# Patient Record
Sex: MALE | Race: BLACK OR AFRICAN AMERICAN | NOT HISPANIC OR LATINO | URBAN - METROPOLITAN AREA
[De-identification: names, ages, dates, MRNs, and addresses within clinical notes are randomized per-mention and may not be internally consistent; named-entity substitution may affect disease eponyms.]

---

## 2017-09-11 ENCOUNTER — INPATIENT (INPATIENT)
Facility: HOSPITAL | Age: 57
LOS: 1 days | Discharge: HOME | DRG: 872 | End: 2017-09-13
Attending: STUDENT IN AN ORGANIZED HEALTH CARE EDUCATION/TRAINING PROGRAM | Admitting: STUDENT IN AN ORGANIZED HEALTH CARE EDUCATION/TRAINING PROGRAM
Payer: COMMERCIAL

## 2017-09-11 VITALS
OXYGEN SATURATION: 98 % | TEMPERATURE: 103 F | SYSTOLIC BLOOD PRESSURE: 166 MMHG | DIASTOLIC BLOOD PRESSURE: 70 MMHG | RESPIRATION RATE: 18 BRPM | HEART RATE: 100 BPM

## 2017-09-11 DIAGNOSIS — E78.5 HYPERLIPIDEMIA, UNSPECIFIED: ICD-10-CM

## 2017-09-11 DIAGNOSIS — I10 ESSENTIAL (PRIMARY) HYPERTENSION: ICD-10-CM

## 2017-09-11 DIAGNOSIS — R63.8 OTHER SYMPTOMS AND SIGNS CONCERNING FOOD AND FLUID INTAKE: ICD-10-CM

## 2017-09-11 DIAGNOSIS — Z87.09 PERSONAL HISTORY OF OTHER DISEASES OF THE RESPIRATORY SYSTEM: ICD-10-CM

## 2017-09-11 DIAGNOSIS — Z29.9 ENCOUNTER FOR PROPHYLACTIC MEASURES, UNSPECIFIED: ICD-10-CM

## 2017-09-11 DIAGNOSIS — E87.6 HYPOKALEMIA: ICD-10-CM

## 2017-09-11 DIAGNOSIS — A41.9 SEPSIS, UNSPECIFIED ORGANISM: ICD-10-CM

## 2017-09-11 DIAGNOSIS — Z82.49 FAMILY HISTORY OF ISCHEMIC HEART DISEASE AND OTHER DISEASES OF THE CIRCULATORY SYSTEM: Chronic | ICD-10-CM

## 2017-09-11 LAB
ALBUMIN SERPL ELPH-MCNC: 4.3 G/DL — SIGNIFICANT CHANGE UP (ref 3.3–5)
ALP SERPL-CCNC: 47 U/L — SIGNIFICANT CHANGE UP (ref 40–120)
ALT FLD-CCNC: 35 U/L — SIGNIFICANT CHANGE UP (ref 10–45)
ANION GAP SERPL CALC-SCNC: 11 MMOL/L — SIGNIFICANT CHANGE UP (ref 5–17)
ANION GAP SERPL CALC-SCNC: 14 MMOL/L — SIGNIFICANT CHANGE UP (ref 5–17)
APPEARANCE UR: CLEAR — SIGNIFICANT CHANGE UP
AST SERPL-CCNC: 91 U/L — HIGH (ref 10–40)
BASOPHILS NFR BLD AUTO: 0 % — SIGNIFICANT CHANGE UP (ref 0–2)
BILIRUB SERPL-MCNC: 0.7 MG/DL — SIGNIFICANT CHANGE UP (ref 0.2–1.2)
BILIRUB UR-MCNC: NEGATIVE — SIGNIFICANT CHANGE UP
BUN SERPL-MCNC: 11 MG/DL — SIGNIFICANT CHANGE UP (ref 7–23)
BUN SERPL-MCNC: 6 MG/DL — LOW (ref 7–23)
CALCIUM SERPL-MCNC: 8 MG/DL — LOW (ref 8.4–10.5)
CALCIUM SERPL-MCNC: 9.3 MG/DL — SIGNIFICANT CHANGE UP (ref 8.4–10.5)
CHLORIDE SERPL-SCNC: 106 MMOL/L — SIGNIFICANT CHANGE UP (ref 96–108)
CHLORIDE SERPL-SCNC: 98 MMOL/L — SIGNIFICANT CHANGE UP (ref 96–108)
CO2 SERPL-SCNC: 22 MMOL/L — SIGNIFICANT CHANGE UP (ref 22–31)
CO2 SERPL-SCNC: 24 MMOL/L — SIGNIFICANT CHANGE UP (ref 22–31)
COLOR SPEC: YELLOW — SIGNIFICANT CHANGE UP
COMMENT - URINE: SIGNIFICANT CHANGE UP
CREAT SERPL-MCNC: 0.9 MG/DL — SIGNIFICANT CHANGE UP (ref 0.5–1.3)
CREAT SERPL-MCNC: 1.1 MG/DL — SIGNIFICANT CHANGE UP (ref 0.5–1.3)
DIFF PNL FLD: (no result)
EOSINOPHIL NFR BLD AUTO: 1 % — SIGNIFICANT CHANGE UP (ref 0–6)
EPI CELLS # UR: SIGNIFICANT CHANGE UP /HPF
GLUCOSE SERPL-MCNC: 119 MG/DL — HIGH (ref 70–99)
GLUCOSE SERPL-MCNC: 121 MG/DL — HIGH (ref 70–99)
GLUCOSE UR QL: NEGATIVE — SIGNIFICANT CHANGE UP
HCT VFR BLD CALC: 34 % — LOW (ref 39–50)
HCT VFR BLD CALC: 38.5 % — LOW (ref 39–50)
HGB BLD-MCNC: 11.3 G/DL — LOW (ref 13–17)
HGB BLD-MCNC: 13.2 G/DL — SIGNIFICANT CHANGE UP (ref 13–17)
HYPOCHROMIA BLD QL: SLIGHT — SIGNIFICANT CHANGE UP
KETONES UR-MCNC: 15 MG/DL
LACTATE SERPL-SCNC: 1.4 MMOL/L — SIGNIFICANT CHANGE UP (ref 0.5–2)
LEUKOCYTE ESTERASE UR-ACNC: NEGATIVE — SIGNIFICANT CHANGE UP
LIDOCAIN IGE QN: 25 U/L — SIGNIFICANT CHANGE UP (ref 7–60)
LYMPHOCYTES # BLD AUTO: 5 % — LOW (ref 13–44)
MANUAL DIF COMMENT BLD-IMP: SIGNIFICANT CHANGE UP
MCHC RBC-ENTMCNC: 29.8 PG — SIGNIFICANT CHANGE UP (ref 27–34)
MCHC RBC-ENTMCNC: 30.4 PG — SIGNIFICANT CHANGE UP (ref 27–34)
MCHC RBC-ENTMCNC: 33.2 G/DL — SIGNIFICANT CHANGE UP (ref 32–36)
MCHC RBC-ENTMCNC: 34.3 G/DL — SIGNIFICANT CHANGE UP (ref 32–36)
MCV RBC AUTO: 88.7 FL — SIGNIFICANT CHANGE UP (ref 80–100)
MCV RBC AUTO: 89.7 FL — SIGNIFICANT CHANGE UP (ref 80–100)
MONOCYTES NFR BLD AUTO: 4 % — SIGNIFICANT CHANGE UP (ref 2–14)
NEUTROPHILS NFR BLD AUTO: 41 % — LOW (ref 43–77)
NEUTS BAND # BLD: 49 % — HIGH
NITRITE UR-MCNC: NEGATIVE — SIGNIFICANT CHANGE UP
PH UR: 6 — SIGNIFICANT CHANGE UP (ref 5–8)
PLAT MORPH BLD: NORMAL — SIGNIFICANT CHANGE UP
PLATELET # BLD AUTO: 207 K/UL — SIGNIFICANT CHANGE UP (ref 150–400)
PLATELET # BLD AUTO: 245 K/UL — SIGNIFICANT CHANGE UP (ref 150–400)
POTASSIUM SERPL-MCNC: 3.2 MMOL/L — LOW (ref 3.5–5.3)
POTASSIUM SERPL-MCNC: 3.4 MMOL/L — LOW (ref 3.5–5.3)
POTASSIUM SERPL-MCNC: 5.9 MMOL/L — HIGH (ref 3.5–5.3)
POTASSIUM SERPL-SCNC: 3.2 MMOL/L — LOW (ref 3.5–5.3)
POTASSIUM SERPL-SCNC: 3.4 MMOL/L — LOW (ref 3.5–5.3)
POTASSIUM SERPL-SCNC: 5.9 MMOL/L — HIGH (ref 3.5–5.3)
PROT SERPL-MCNC: 7.8 G/DL — SIGNIFICANT CHANGE UP (ref 6–8.3)
PROT UR-MCNC: NEGATIVE MG/DL — SIGNIFICANT CHANGE UP
RBC # BLD: 3.79 M/UL — LOW (ref 4.2–5.8)
RBC # BLD: 4.34 M/UL — SIGNIFICANT CHANGE UP (ref 4.2–5.8)
RBC # FLD: 12.3 % — SIGNIFICANT CHANGE UP (ref 10.3–16.9)
RBC # FLD: 12.3 % — SIGNIFICANT CHANGE UP (ref 10.3–16.9)
RBC BLD AUTO: (no result)
RBC CASTS # UR COMP ASSIST: < 5 /HPF — SIGNIFICANT CHANGE UP
SODIUM SERPL-SCNC: 136 MMOL/L — SIGNIFICANT CHANGE UP (ref 135–145)
SODIUM SERPL-SCNC: 139 MMOL/L — SIGNIFICANT CHANGE UP (ref 135–145)
SP GR SPEC: 1.01 — SIGNIFICANT CHANGE UP (ref 1–1.03)
UROBILINOGEN FLD QL: 0.2 E.U./DL — SIGNIFICANT CHANGE UP
WBC # BLD: 12 K/UL — HIGH (ref 3.8–10.5)
WBC # BLD: 8.9 K/UL — SIGNIFICANT CHANGE UP (ref 3.8–10.5)
WBC # FLD AUTO: 12 K/UL — HIGH (ref 3.8–10.5)
WBC # FLD AUTO: 8.9 K/UL — SIGNIFICANT CHANGE UP (ref 3.8–10.5)
WBC UR QL: < 5 /HPF — SIGNIFICANT CHANGE UP

## 2017-09-11 PROCEDURE — 99285 EMERGENCY DEPT VISIT HI MDM: CPT | Mod: 25

## 2017-09-11 PROCEDURE — 99223 1ST HOSP IP/OBS HIGH 75: CPT

## 2017-09-11 PROCEDURE — 93010 ELECTROCARDIOGRAM REPORT: CPT | Mod: 77

## 2017-09-11 PROCEDURE — 74177 CT ABD & PELVIS W/CONTRAST: CPT | Mod: 26

## 2017-09-11 PROCEDURE — 93010 ELECTROCARDIOGRAM REPORT: CPT

## 2017-09-11 RX ORDER — CIPROFLOXACIN LACTATE 400MG/40ML
400 VIAL (ML) INTRAVENOUS EVERY 12 HOURS
Qty: 0 | Refills: 0 | Status: DISCONTINUED | OUTPATIENT
Start: 2017-09-11 | End: 2017-09-13

## 2017-09-11 RX ORDER — ATORVASTATIN CALCIUM 80 MG/1
20 TABLET, FILM COATED ORAL AT BEDTIME
Qty: 0 | Refills: 0 | Status: DISCONTINUED | OUTPATIENT
Start: 2017-09-11 | End: 2017-09-13

## 2017-09-11 RX ORDER — MORPHINE SULFATE 50 MG/1
0.5 CAPSULE, EXTENDED RELEASE ORAL EVERY 6 HOURS
Qty: 0 | Refills: 0 | Status: DISCONTINUED | OUTPATIENT
Start: 2017-09-11 | End: 2017-09-13

## 2017-09-11 RX ORDER — ROSUVASTATIN CALCIUM 5 MG/1
1 TABLET ORAL
Qty: 0 | Refills: 0 | COMMUNITY

## 2017-09-11 RX ORDER — METRONIDAZOLE 500 MG
500 TABLET ORAL EVERY 8 HOURS
Qty: 0 | Refills: 0 | Status: DISCONTINUED | OUTPATIENT
Start: 2017-09-11 | End: 2017-09-13

## 2017-09-11 RX ORDER — SODIUM CHLORIDE 9 MG/ML
1000 INJECTION INTRAMUSCULAR; INTRAVENOUS; SUBCUTANEOUS
Qty: 0 | Refills: 0 | Status: DISCONTINUED | OUTPATIENT
Start: 2017-09-11 | End: 2017-09-13

## 2017-09-11 RX ORDER — BUDESONIDE AND FORMOTEROL FUMARATE DIHYDRATE 160; 4.5 UG/1; UG/1
2 AEROSOL RESPIRATORY (INHALATION)
Qty: 0 | Refills: 0 | COMMUNITY

## 2017-09-11 RX ORDER — POTASSIUM CHLORIDE 20 MEQ
20 PACKET (EA) ORAL
Qty: 0 | Refills: 0 | Status: DISCONTINUED | OUTPATIENT
Start: 2017-09-11 | End: 2017-09-11

## 2017-09-11 RX ORDER — POTASSIUM CHLORIDE 20 MEQ
40 PACKET (EA) ORAL ONCE
Qty: 0 | Refills: 0 | Status: COMPLETED | OUTPATIENT
Start: 2017-09-11 | End: 2017-09-11

## 2017-09-11 RX ORDER — POTASSIUM CHLORIDE 20 MEQ
10 PACKET (EA) ORAL
Qty: 0 | Refills: 0 | Status: COMPLETED | OUTPATIENT
Start: 2017-09-11 | End: 2017-09-11

## 2017-09-11 RX ORDER — POTASSIUM CHLORIDE 20 MEQ
10 PACKET (EA) ORAL ONCE
Qty: 0 | Refills: 0 | Status: DISCONTINUED | OUTPATIENT
Start: 2017-09-11 | End: 2017-09-13

## 2017-09-11 RX ORDER — METRONIDAZOLE 500 MG
500 TABLET ORAL ONCE
Qty: 0 | Refills: 0 | Status: COMPLETED | OUTPATIENT
Start: 2017-09-11 | End: 2017-09-11

## 2017-09-11 RX ORDER — SODIUM CHLORIDE 9 MG/ML
1000 INJECTION INTRAMUSCULAR; INTRAVENOUS; SUBCUTANEOUS ONCE
Qty: 0 | Refills: 0 | Status: COMPLETED | OUTPATIENT
Start: 2017-09-11 | End: 2017-09-11

## 2017-09-11 RX ORDER — IOHEXOL 300 MG/ML
50 INJECTION, SOLUTION INTRAVENOUS ONCE
Qty: 0 | Refills: 0 | Status: COMPLETED | OUTPATIENT
Start: 2017-09-11 | End: 2017-09-11

## 2017-09-11 RX ORDER — ACETAMINOPHEN 500 MG
650 TABLET ORAL ONCE
Qty: 0 | Refills: 0 | Status: COMPLETED | OUTPATIENT
Start: 2017-09-11 | End: 2017-09-11

## 2017-09-11 RX ORDER — ACETAMINOPHEN 500 MG
1000 TABLET ORAL ONCE
Qty: 0 | Refills: 0 | Status: COMPLETED | OUTPATIENT
Start: 2017-09-11 | End: 2017-09-11

## 2017-09-11 RX ORDER — BUDESONIDE AND FORMOTEROL FUMARATE DIHYDRATE 160; 4.5 UG/1; UG/1
2 AEROSOL RESPIRATORY (INHALATION)
Qty: 0 | Refills: 0 | Status: DISCONTINUED | OUTPATIENT
Start: 2017-09-11 | End: 2017-09-13

## 2017-09-11 RX ORDER — SODIUM CHLORIDE 9 MG/ML
1000 INJECTION INTRAMUSCULAR; INTRAVENOUS; SUBCUTANEOUS
Qty: 0 | Refills: 0 | Status: DISCONTINUED | OUTPATIENT
Start: 2017-09-11 | End: 2017-09-11

## 2017-09-11 RX ORDER — MORPHINE SULFATE 50 MG/1
0.5 CAPSULE, EXTENDED RELEASE ORAL ONCE
Qty: 0 | Refills: 0 | Status: DISCONTINUED | OUTPATIENT
Start: 2017-09-11 | End: 2017-09-11

## 2017-09-11 RX ORDER — CIPROFLOXACIN LACTATE 400MG/40ML
400 VIAL (ML) INTRAVENOUS ONCE
Qty: 0 | Refills: 0 | Status: COMPLETED | OUTPATIENT
Start: 2017-09-11 | End: 2017-09-11

## 2017-09-11 RX ORDER — ONDANSETRON 8 MG/1
4 TABLET, FILM COATED ORAL EVERY 6 HOURS
Qty: 0 | Refills: 0 | Status: DISCONTINUED | OUTPATIENT
Start: 2017-09-11 | End: 2017-09-13

## 2017-09-11 RX ORDER — KETOROLAC TROMETHAMINE 30 MG/ML
30 SYRINGE (ML) INJECTION ONCE
Qty: 0 | Refills: 0 | Status: DISCONTINUED | OUTPATIENT
Start: 2017-09-11 | End: 2017-09-11

## 2017-09-11 RX ORDER — ACETAMINOPHEN 500 MG
650 TABLET ORAL EVERY 6 HOURS
Qty: 0 | Refills: 0 | Status: DISCONTINUED | OUTPATIENT
Start: 2017-09-11 | End: 2017-09-13

## 2017-09-11 RX ADMIN — IOHEXOL 50 MILLILITER(S): 300 INJECTION, SOLUTION INTRAVENOUS at 05:08

## 2017-09-11 RX ADMIN — MORPHINE SULFATE 0.5 MILLIGRAM(S): 50 CAPSULE, EXTENDED RELEASE ORAL at 11:00

## 2017-09-11 RX ADMIN — Medication 30 MILLIGRAM(S): at 04:35

## 2017-09-11 RX ADMIN — SODIUM CHLORIDE 150 MILLILITER(S): 9 INJECTION INTRAMUSCULAR; INTRAVENOUS; SUBCUTANEOUS at 11:07

## 2017-09-11 RX ADMIN — Medication 650 MILLIGRAM(S): at 10:07

## 2017-09-11 RX ADMIN — MORPHINE SULFATE 0.5 MILLIGRAM(S): 50 CAPSULE, EXTENDED RELEASE ORAL at 17:24

## 2017-09-11 RX ADMIN — SODIUM CHLORIDE 2000 MILLILITER(S): 9 INJECTION INTRAMUSCULAR; INTRAVENOUS; SUBCUTANEOUS at 05:08

## 2017-09-11 RX ADMIN — Medication 650 MILLIGRAM(S): at 17:24

## 2017-09-11 RX ADMIN — Medication 200 MILLIGRAM(S): at 17:24

## 2017-09-11 RX ADMIN — Medication 100 MILLIGRAM(S): at 15:14

## 2017-09-11 RX ADMIN — Medication 30 MILLIGRAM(S): at 05:08

## 2017-09-11 RX ADMIN — MORPHINE SULFATE 0.5 MILLIGRAM(S): 50 CAPSULE, EXTENDED RELEASE ORAL at 10:45

## 2017-09-11 RX ADMIN — ATORVASTATIN CALCIUM 20 MILLIGRAM(S): 80 TABLET, FILM COATED ORAL at 22:59

## 2017-09-11 RX ADMIN — Medication 200 MILLIGRAM(S): at 05:08

## 2017-09-11 RX ADMIN — MORPHINE SULFATE 0.5 MILLIGRAM(S): 50 CAPSULE, EXTENDED RELEASE ORAL at 17:39

## 2017-09-11 RX ADMIN — Medication 100 MILLIEQUIVALENT(S): at 12:11

## 2017-09-11 RX ADMIN — Medication 100 MILLIEQUIVALENT(S): at 10:45

## 2017-09-11 RX ADMIN — Medication 400 MILLIGRAM(S): at 11:20

## 2017-09-11 RX ADMIN — SODIUM CHLORIDE 2000 MILLILITER(S): 9 INJECTION INTRAMUSCULAR; INTRAVENOUS; SUBCUTANEOUS at 11:07

## 2017-09-11 RX ADMIN — Medication 650 MILLIGRAM(S): at 03:36

## 2017-09-11 RX ADMIN — SODIUM CHLORIDE 100 MILLILITER(S): 9 INJECTION INTRAMUSCULAR; INTRAVENOUS; SUBCUTANEOUS at 10:45

## 2017-09-11 RX ADMIN — Medication 100 MILLIGRAM(S): at 06:07

## 2017-09-11 RX ADMIN — BUDESONIDE AND FORMOTEROL FUMARATE DIHYDRATE 2 PUFF(S): 160; 4.5 AEROSOL RESPIRATORY (INHALATION) at 22:59

## 2017-09-11 RX ADMIN — Medication 100 MILLIGRAM(S): at 23:38

## 2017-09-11 RX ADMIN — Medication 100 MILLIEQUIVALENT(S): at 14:04

## 2017-09-11 RX ADMIN — Medication 40 MILLIEQUIVALENT(S): at 10:07

## 2017-09-11 RX ADMIN — SODIUM CHLORIDE 2000 MILLILITER(S): 9 INJECTION INTRAMUSCULAR; INTRAVENOUS; SUBCUTANEOUS at 03:36

## 2017-09-11 NOTE — H&P ADULT - PROBLEM SELECTOR PLAN 3
Pt hypertensive on admission w/ BP of 166/70.   -hold home dose of HCTZ 25mg in the setting of sepsis. Pt hypertensive on admission w/ BP of 166/70.   -hold home dose of HCTZ 25mg in the setting of sepsis and diarrhea.

## 2017-09-11 NOTE — ED ADULT NURSE NOTE - OBJECTIVE STATEMENT
pt walked into ED c/o fever, chills, diarrhea onset 4pm yesterday. Pt states he has sharp pain when he urinates. pt denies blood in urine. pt denies N/V, CP, SOB, pt walked into ED c/o fever, chills, diarrhea onset 4pm yesterday. Pt states he has sharp pain when he urinates. pt denies blood in urine. pt denies N/V, CP, SOB, dizziness.

## 2017-09-11 NOTE — H&P ADULT - ASSESSMENT
58 y/o M w/ PMHx of Asthma, HTN, HLD presenting w/ diffuse abdominal pain, nausea, watery, non-bloody diarrhea 2/2 to infectious/inflammatory colitis.

## 2017-09-11 NOTE — H&P ADULT - PROBLEM SELECTOR PLAN 1
Pt w/ T >100, HR >100, WBC of 12 w/ 49% bandemia presenting w/ abdominal pain and non-bloody, watery diarrhea secondary to Pt w/ T >100, HR >100, WBC of 12 w/ 49% bandemia on admission presenting w/ abdominal pain and non-bloody, watery diarrhea secondary to infectious vs. inflammatory colitis on CT scan.. Lactate 1.4 on admission.   -s/p 1 L NS   -c/w Cipro 400mg q12hr+ Flagyl 500mg q8hrs  -Pain control  -Tylenol 650mg PRN for fever  -Zofran 4mg q6hrs for nausea Secondary to infectious vs. inflmattory colitis. Pt w/ T >100, HR >100, WBC of 12 w/ 49% bandemia on admission presenting w/ abdominal pain and non-bloody, watery diarrhea. Lactate 1.4 on admission.   -s/p 1 L NS   -c/w Cipro 400mg q12hr+ Flagyl 500mg q8hrs  -Pain control w/ IV morphine 0.5mg x1  -Tylenol 650mg PRN for fever  -Zofran 4mg q6hrs for nausea  -NS at 100cc/hr Secondary to infectious vs. inflmattory colitis. Pt w/ T >100, HR >100, WBC of 12 w/ 49% bandemia on admission presenting w/ abdominal pain and non-bloody, watery diarrhea. Lactate 1.4 on admission.   -s/p 1 L NS in the ED.   -1 L NS and NS @150cc/hr.   -c/w Cipro 400mg q12hr+ Flagyl 500mg q8hrs  -Pain control w/ IV morphine 0.5mg q6hrs  -Tylenol 650mg x1  -IV Tylenol 1g x1  -Zofran 4mg q6hrs for nausea

## 2017-09-11 NOTE — H&P ADULT - NSHPSOCIALHISTORY_GEN_ALL_CORE
Denies tobacco.   Denies alcohol use.   Denies IV and recreational drugs.   Pt lives at home w/ family.

## 2017-09-11 NOTE — ED PROVIDER NOTE - OBJECTIVE STATEMENT
here with diarrhea today, body aches, fever/chills.  Notes about 6-8 watery episodes, non bloody.  No vomiting.  Denies sick contacts, travel, recent antibiotic use.

## 2017-09-11 NOTE — H&P ADULT - ATTENDING COMMENTS
Pt seen and examined, VS reviewed, exam as above, labs reviewed.  56 yo M with sepsis 2/2 colitis, hx of asthma, htn, hpl  - pt quite febrile and rigoring 2 x during day, concern for bacterial translocation- f/u bcx from overnight, has received 2 L IVF and euvolemic and hemodyn stable, started on cipro/flagyl  - will f/u closely  - dispo: for home once medically stable

## 2017-09-11 NOTE — H&P ADULT - PROBLEM SELECTOR PLAN 4
Never been intubated. Pt currently not in an acute asthma exacerbation. Satting well on room air, does not appear to be in respiratory distress.   -c/w symbicort 2 puffs BID

## 2017-09-11 NOTE — H&P ADULT - HISTORY OF PRESENT ILLNESS
56 y/o M w/ PMHx of Asthma, HTN, HLD presenting w/ diarrhea. 58 y/o M w/ PMHx of Asthma, HTN, HLD presenting w/ abdominal pain and diarrhea since Sunday afternoon. Pt reports that his symptoms first began Sunday around 4pm after having a meal consisting of rice, vegetables and fish. About 1 hour later pt reports he began having nausea, abdominal pain, fever, chills and watery, non-bloody diarrhea. Pt reports having about 8-10 bowel movements yesterday. Does not report any recent travel or recent abx use. Reports having similar symptoms about 3 years ago.     Currently on ROS pt complaining of diffuse abdominal pain, body aches, nausea, chills, diarrhea. Denies chest pain, shortness of breath, headache, dizziness, constipation.     ED Course:   T: 102.9, , /70, RR 18, O2 98% on room air  NS x1L, Cipro 400mg x1, Flagyl 500mg x1, Toradol 30mg IV x1.   CT abd/pelvis: Moderate infectious/inflammatory colitis of ascending and proximal transverse colon.

## 2017-09-11 NOTE — ED PROVIDER NOTE - MEDICAL DECISION MAKING DETAILS
acute febrile diarrheal illness.  labs with leukocytosis, significant bandemia.  suspect shigella infection.  given cipro/flagyl pending cultures.  ct to r/o other pathology.  admit for further treatment. acute febrile diarrheal illness.  labs with leukocytosis, significant bandemia.  suspect shigella infection.  given cipro/flagyl pending cultures.  ct to r/o other pathology.  signed out to Dr. Hill/ RITA Matt pending ct with plan to admit for further treatment.

## 2017-09-11 NOTE — H&P ADULT - PROBLEM SELECTOR PLAN 2
Pt initially w/ potassium of 5.9 on admission but due to hemolyzed RBC. Repeat potassium of 3.2. EKG normal.   -replete KCL 10meq x3  -KCL 40meq tablet x1  -continue to monitor, replete as needed. Pt initially w/ potassium of 5.9 on admission but due to hemolyzed RBC. Repeat potassium of 3.2 secondary to watery diarrhea. EKG normal.   -replete KCL 10meq x3  -KCL 40meq tablet x1  -continue to monitor, replete as needed.

## 2017-09-11 NOTE — ED ADULT NURSE REASSESSMENT NOTE - NS ED NURSE REASSESS COMMENT FT1
Patient had a temperature here in the ED, given tylenol as ordered by team. Nurse kina made team aware of fever of 103F.

## 2017-09-11 NOTE — H&P ADULT - PROBLEM SELECTOR PLAN 5
Pt w/ hx of hyperlipidemia  -On Crestor 5mg daily at home. Therapeutic interchange w/ atorvastatin 20mg daily.

## 2017-09-11 NOTE — H&P ADULT - NSHPLABSRESULTS_GEN_ALL_CORE
.  LABS:                         13.2   12.0  )-----------( 245      ( 11 Sep 2017 03:10 )             38.5         x   |  x   |  x   ----------------------------<  x   3.2<L>   |  x   |  x     Ca    9.3      11 Sep 2017 03:10    TPro  7.8  /  Alb  4.3  /  TBili  0.7  /  DBili  x   /  AST  91<H>  /  ALT  35  /  AlkPhos  47        Urinalysis Basic - ( 11 Sep 2017 03:37 )    Color: Yellow / Appearance: Clear / S.010 / pH: x  Gluc: x / Ketone: 15 mg/dL  / Bili: NEGATIVE / Urobili: 0.2 E.U./dL   Blood: x / Protein: NEGATIVE mg/dL / Nitrite: NEGATIVE   Leuk Esterase: NEGATIVE / RBC: < 5 /HPF / WBC < 5 /HPF   Sq Epi: x / Non Sq Epi: Few /HPF / Bacteria: x    Lactate, Blood: 1.4 mmoL/L ( @ 03:07)      RADIOLOGY, EKG & ADDITIONAL TESTS: < from: CT Abdomen and Pelvis w/ Oral Cont and w/ IV Cont (17 @ 06:40) >    IMPRESSION:    Moderate infectious/inflammatory colitis of the ascending and proximal   transverse colon.

## 2017-09-11 NOTE — H&P ADULT - NSHPPHYSICALEXAM_GEN_ALL_CORE
.  VITAL SIGNS:  T(C): 37.4 (09-11-17 @ 08:24), Max: 39.4 (09-11-17 @ 02:30)  T(F): 99.4 (09-11-17 @ 08:24), Max: 102.9 (09-11-17 @ 02:30)  HR: 92 (09-11-17 @ 08:24) (82 - 100)  BP: 125/52 (09-11-17 @ 08:24) (119/63 - 166/70)  BP(mean): --  RR: 18 (09-11-17 @ 08:24) (18 - 19)  SpO2: 97% (09-11-17 @ 08:24) (97% - 99%)  Wt(kg): --    PHYSICAL EXAM:    Constitutional: WDWN gentleman in apparent distress due to pain laying in bed.   Head: NC/AT  Eyes: PERRL, EOMI, anicteric sclera  ENT: no nasal discharge; uvula midline, no oropharyngeal erythema or exudates; MMM  Neck: supple; no JVD or thyromegaly  Respiratory: CTA B/L; no W/R/R, no retractions  Cardiac: +S1/S2; RRR; no M/R/G; PMI non-displaced  Gastrointestinal: soft, ND, no guarding, diffuse tenderness to palpation. + BS.   Extremities: WWP, no clubbing or cyanosis; no peripheral edema  Musculoskeletal: NROM x4; no joint swelling, tenderness or erythema  Vascular: 2+ DP/PT pulses B/L  Neurologic: AAOx3; CNII-XII grossly intact; no focal deficits

## 2017-09-12 DIAGNOSIS — J45.909 UNSPECIFIED ASTHMA, UNCOMPLICATED: ICD-10-CM

## 2017-09-12 DIAGNOSIS — E78.5 HYPERLIPIDEMIA, UNSPECIFIED: ICD-10-CM

## 2017-09-12 DIAGNOSIS — A41.9 SEPSIS, UNSPECIFIED ORGANISM: ICD-10-CM

## 2017-09-12 LAB
ANION GAP SERPL CALC-SCNC: 13 MMOL/L — SIGNIFICANT CHANGE UP (ref 5–17)
ANION GAP SERPL CALC-SCNC: 13 MMOL/L — SIGNIFICANT CHANGE UP (ref 5–17)
ANION GAP SERPL CALC-SCNC: 15 MMOL/L — SIGNIFICANT CHANGE UP (ref 5–17)
BASOPHILS NFR BLD AUTO: 0.1 % — SIGNIFICANT CHANGE UP (ref 0–2)
BUN SERPL-MCNC: 7 MG/DL — SIGNIFICANT CHANGE UP (ref 7–23)
BUN SERPL-MCNC: 7 MG/DL — SIGNIFICANT CHANGE UP (ref 7–23)
BUN SERPL-MCNC: 8 MG/DL — SIGNIFICANT CHANGE UP (ref 7–23)
CALCIUM SERPL-MCNC: 8.2 MG/DL — LOW (ref 8.4–10.5)
CALCIUM SERPL-MCNC: 8.3 MG/DL — LOW (ref 8.4–10.5)
CALCIUM SERPL-MCNC: 8.4 MG/DL — SIGNIFICANT CHANGE UP (ref 8.4–10.5)
CHLORIDE SERPL-SCNC: 100 MMOL/L — SIGNIFICANT CHANGE UP (ref 96–108)
CHLORIDE SERPL-SCNC: 102 MMOL/L — SIGNIFICANT CHANGE UP (ref 96–108)
CHLORIDE SERPL-SCNC: 103 MMOL/L — SIGNIFICANT CHANGE UP (ref 96–108)
CO2 SERPL-SCNC: 21 MMOL/L — LOW (ref 22–31)
CO2 SERPL-SCNC: 21 MMOL/L — LOW (ref 22–31)
CO2 SERPL-SCNC: 22 MMOL/L — SIGNIFICANT CHANGE UP (ref 22–31)
CREAT SERPL-MCNC: 0.9 MG/DL — SIGNIFICANT CHANGE UP (ref 0.5–1.3)
CREAT SERPL-MCNC: 1 MG/DL — SIGNIFICANT CHANGE UP (ref 0.5–1.3)
CREAT SERPL-MCNC: 1 MG/DL — SIGNIFICANT CHANGE UP (ref 0.5–1.3)
CULTURE RESULTS: NO GROWTH — SIGNIFICANT CHANGE UP
EOSINOPHIL NFR BLD AUTO: 0.1 % — SIGNIFICANT CHANGE UP (ref 0–6)
GLUCOSE SERPL-MCNC: 104 MG/DL — HIGH (ref 70–99)
GLUCOSE SERPL-MCNC: 121 MG/DL — HIGH (ref 70–99)
GLUCOSE SERPL-MCNC: 90 MG/DL — SIGNIFICANT CHANGE UP (ref 70–99)
HCT VFR BLD CALC: 33.6 % — LOW (ref 39–50)
HGB BLD-MCNC: 11.1 G/DL — LOW (ref 13–17)
LYMPHOCYTES # BLD AUTO: 11 % — LOW (ref 13–44)
MAGNESIUM SERPL-MCNC: 1.9 MG/DL — SIGNIFICANT CHANGE UP (ref 1.6–2.6)
MCHC RBC-ENTMCNC: 30 PG — SIGNIFICANT CHANGE UP (ref 27–34)
MCHC RBC-ENTMCNC: 33 G/DL — SIGNIFICANT CHANGE UP (ref 32–36)
MCV RBC AUTO: 90.8 FL — SIGNIFICANT CHANGE UP (ref 80–100)
MONOCYTES NFR BLD AUTO: 4.3 % — SIGNIFICANT CHANGE UP (ref 2–14)
NEUTROPHILS NFR BLD AUTO: 84.5 % — HIGH (ref 43–77)
PHOSPHATE SERPL-MCNC: 2.4 MG/DL — LOW (ref 2.5–4.5)
PLATELET # BLD AUTO: 201 K/UL — SIGNIFICANT CHANGE UP (ref 150–400)
POTASSIUM SERPL-MCNC: 3 MMOL/L — LOW (ref 3.5–5.3)
POTASSIUM SERPL-MCNC: 3.5 MMOL/L — SIGNIFICANT CHANGE UP (ref 3.5–5.3)
POTASSIUM SERPL-MCNC: 3.5 MMOL/L — SIGNIFICANT CHANGE UP (ref 3.5–5.3)
POTASSIUM SERPL-SCNC: 3 MMOL/L — LOW (ref 3.5–5.3)
POTASSIUM SERPL-SCNC: 3.5 MMOL/L — SIGNIFICANT CHANGE UP (ref 3.5–5.3)
POTASSIUM SERPL-SCNC: 3.5 MMOL/L — SIGNIFICANT CHANGE UP (ref 3.5–5.3)
RBC # BLD: 3.7 M/UL — LOW (ref 4.2–5.8)
RBC # FLD: 12.3 % — SIGNIFICANT CHANGE UP (ref 10.3–16.9)
SODIUM SERPL-SCNC: 135 MMOL/L — SIGNIFICANT CHANGE UP (ref 135–145)
SODIUM SERPL-SCNC: 137 MMOL/L — SIGNIFICANT CHANGE UP (ref 135–145)
SODIUM SERPL-SCNC: 138 MMOL/L — SIGNIFICANT CHANGE UP (ref 135–145)
SPECIMEN SOURCE: SIGNIFICANT CHANGE UP
WBC # BLD: 8.8 K/UL — SIGNIFICANT CHANGE UP (ref 3.8–10.5)
WBC # FLD AUTO: 8.8 K/UL — SIGNIFICANT CHANGE UP (ref 3.8–10.5)

## 2017-09-12 PROCEDURE — 99233 SBSQ HOSP IP/OBS HIGH 50: CPT

## 2017-09-12 RX ORDER — POTASSIUM CHLORIDE 20 MEQ
40 PACKET (EA) ORAL ONCE
Qty: 0 | Refills: 0 | Status: COMPLETED | OUTPATIENT
Start: 2017-09-12 | End: 2017-09-12

## 2017-09-12 RX ORDER — ACETAMINOPHEN 500 MG
650 TABLET ORAL ONCE
Qty: 0 | Refills: 0 | Status: COMPLETED | OUTPATIENT
Start: 2017-09-12 | End: 2017-09-12

## 2017-09-12 RX ORDER — POTASSIUM CHLORIDE 20 MEQ
10 PACKET (EA) ORAL
Qty: 0 | Refills: 0 | Status: COMPLETED | OUTPATIENT
Start: 2017-09-12 | End: 2017-09-12

## 2017-09-12 RX ADMIN — MORPHINE SULFATE 0.5 MILLIGRAM(S): 50 CAPSULE, EXTENDED RELEASE ORAL at 22:17

## 2017-09-12 RX ADMIN — Medication 40 MILLIEQUIVALENT(S): at 04:12

## 2017-09-12 RX ADMIN — Medication 200 MILLIGRAM(S): at 05:31

## 2017-09-12 RX ADMIN — SODIUM CHLORIDE 150 MILLILITER(S): 9 INJECTION INTRAMUSCULAR; INTRAVENOUS; SUBCUTANEOUS at 05:31

## 2017-09-12 RX ADMIN — MORPHINE SULFATE 0.5 MILLIGRAM(S): 50 CAPSULE, EXTENDED RELEASE ORAL at 21:22

## 2017-09-12 RX ADMIN — Medication 100 MILLIEQUIVALENT(S): at 04:13

## 2017-09-12 RX ADMIN — Medication 100 MILLIGRAM(S): at 08:31

## 2017-09-12 RX ADMIN — Medication 40 MILLIEQUIVALENT(S): at 10:10

## 2017-09-12 RX ADMIN — Medication 650 MILLIGRAM(S): at 01:06

## 2017-09-12 RX ADMIN — Medication 200 MILLIGRAM(S): at 17:25

## 2017-09-12 RX ADMIN — BUDESONIDE AND FORMOTEROL FUMARATE DIHYDRATE 2 PUFF(S): 160; 4.5 AEROSOL RESPIRATORY (INHALATION) at 22:55

## 2017-09-12 RX ADMIN — Medication 650 MILLIGRAM(S): at 15:06

## 2017-09-12 RX ADMIN — Medication 40 MILLIEQUIVALENT(S): at 20:32

## 2017-09-12 RX ADMIN — Medication 100 MILLIEQUIVALENT(S): at 06:47

## 2017-09-12 RX ADMIN — BUDESONIDE AND FORMOTEROL FUMARATE DIHYDRATE 2 PUFF(S): 160; 4.5 AEROSOL RESPIRATORY (INHALATION) at 10:10

## 2017-09-12 RX ADMIN — Medication 650 MILLIGRAM(S): at 14:36

## 2017-09-12 RX ADMIN — ATORVASTATIN CALCIUM 20 MILLIGRAM(S): 80 TABLET, FILM COATED ORAL at 21:22

## 2017-09-12 RX ADMIN — Medication 100 MILLIGRAM(S): at 14:38

## 2017-09-13 ENCOUNTER — TRANSCRIPTION ENCOUNTER (OUTPATIENT)
Age: 57
End: 2017-09-13

## 2017-09-13 VITALS
TEMPERATURE: 99 F | SYSTOLIC BLOOD PRESSURE: 133 MMHG | OXYGEN SATURATION: 98 % | HEART RATE: 66 BPM | RESPIRATION RATE: 16 BRPM | DIASTOLIC BLOOD PRESSURE: 84 MMHG

## 2017-09-13 LAB
ANION GAP SERPL CALC-SCNC: 14 MMOL/L — SIGNIFICANT CHANGE UP (ref 5–17)
BUN SERPL-MCNC: 7 MG/DL — SIGNIFICANT CHANGE UP (ref 7–23)
CALCIUM SERPL-MCNC: 8.9 MG/DL — SIGNIFICANT CHANGE UP (ref 8.4–10.5)
CHLORIDE SERPL-SCNC: 102 MMOL/L — SIGNIFICANT CHANGE UP (ref 96–108)
CO2 SERPL-SCNC: 22 MMOL/L — SIGNIFICANT CHANGE UP (ref 22–31)
CREAT SERPL-MCNC: 0.9 MG/DL — SIGNIFICANT CHANGE UP (ref 0.5–1.3)
CULTURE RESULTS: SIGNIFICANT CHANGE UP
GLUCOSE SERPL-MCNC: 147 MG/DL — HIGH (ref 70–99)
HCT VFR BLD CALC: 34.8 % — LOW (ref 39–50)
HGB BLD-MCNC: 11.6 G/DL — LOW (ref 13–17)
MAGNESIUM SERPL-MCNC: 2 MG/DL — SIGNIFICANT CHANGE UP (ref 1.6–2.6)
MCHC RBC-ENTMCNC: 29.8 PG — SIGNIFICANT CHANGE UP (ref 27–34)
MCHC RBC-ENTMCNC: 33.3 G/DL — SIGNIFICANT CHANGE UP (ref 32–36)
MCV RBC AUTO: 89.5 FL — SIGNIFICANT CHANGE UP (ref 80–100)
PHOSPHATE SERPL-MCNC: 1.7 MG/DL — LOW (ref 2.5–4.5)
PLATELET # BLD AUTO: 220 K/UL — SIGNIFICANT CHANGE UP (ref 150–400)
POTASSIUM SERPL-MCNC: 3.6 MMOL/L — SIGNIFICANT CHANGE UP (ref 3.5–5.3)
POTASSIUM SERPL-SCNC: 3.6 MMOL/L — SIGNIFICANT CHANGE UP (ref 3.5–5.3)
RBC # BLD: 3.89 M/UL — LOW (ref 4.2–5.8)
RBC # FLD: 12.7 % — SIGNIFICANT CHANGE UP (ref 10.3–16.9)
SODIUM SERPL-SCNC: 138 MMOL/L — SIGNIFICANT CHANGE UP (ref 135–145)
SPECIMEN SOURCE: SIGNIFICANT CHANGE UP
WBC # BLD: 6 K/UL — SIGNIFICANT CHANGE UP (ref 3.8–10.5)
WBC # FLD AUTO: 6 K/UL — SIGNIFICANT CHANGE UP (ref 3.8–10.5)

## 2017-09-13 PROCEDURE — 93005 ELECTROCARDIOGRAM TRACING: CPT | Mod: 77

## 2017-09-13 PROCEDURE — 90686 IIV4 VACC NO PRSV 0.5 ML IM: CPT

## 2017-09-13 PROCEDURE — 99238 HOSP IP/OBS DSCHRG MGMT 30/<: CPT

## 2017-09-13 PROCEDURE — 80048 BASIC METABOLIC PNL TOTAL CA: CPT

## 2017-09-13 PROCEDURE — 87045 FECES CULTURE AEROBIC BACT: CPT

## 2017-09-13 PROCEDURE — 87046 STOOL CULTR AEROBIC BACT EA: CPT

## 2017-09-13 PROCEDURE — 80053 COMPREHEN METABOLIC PANEL: CPT

## 2017-09-13 PROCEDURE — 81001 URINALYSIS AUTO W/SCOPE: CPT

## 2017-09-13 PROCEDURE — 74177 CT ABD & PELVIS W/CONTRAST: CPT

## 2017-09-13 PROCEDURE — 87040 BLOOD CULTURE FOR BACTERIA: CPT

## 2017-09-13 PROCEDURE — 83690 ASSAY OF LIPASE: CPT

## 2017-09-13 PROCEDURE — 36415 COLL VENOUS BLD VENIPUNCTURE: CPT

## 2017-09-13 PROCEDURE — 96375 TX/PRO/DX INJ NEW DRUG ADDON: CPT

## 2017-09-13 PROCEDURE — 87177 OVA AND PARASITES SMEARS: CPT

## 2017-09-13 PROCEDURE — 84100 ASSAY OF PHOSPHORUS: CPT

## 2017-09-13 PROCEDURE — 84132 ASSAY OF SERUM POTASSIUM: CPT

## 2017-09-13 PROCEDURE — 83735 ASSAY OF MAGNESIUM: CPT

## 2017-09-13 PROCEDURE — 96374 THER/PROPH/DIAG INJ IV PUSH: CPT | Mod: XU

## 2017-09-13 PROCEDURE — 94640 AIRWAY INHALATION TREATMENT: CPT

## 2017-09-13 PROCEDURE — 99285 EMERGENCY DEPT VISIT HI MDM: CPT | Mod: 25

## 2017-09-13 PROCEDURE — 85025 COMPLETE CBC W/AUTO DIFF WBC: CPT

## 2017-09-13 PROCEDURE — 83605 ASSAY OF LACTIC ACID: CPT

## 2017-09-13 PROCEDURE — 87086 URINE CULTURE/COLONY COUNT: CPT

## 2017-09-13 PROCEDURE — 85027 COMPLETE CBC AUTOMATED: CPT

## 2017-09-13 RX ORDER — METRONIDAZOLE 500 MG
1 TABLET ORAL
Qty: 7 | Refills: 0 | OUTPATIENT
Start: 2017-09-13 | End: 2017-09-15

## 2017-09-13 RX ORDER — POTASSIUM PHOSPHATE, MONOBASIC POTASSIUM PHOSPHATE, DIBASIC 236; 224 MG/ML; MG/ML
30 INJECTION, SOLUTION INTRAVENOUS ONCE
Qty: 0 | Refills: 0 | Status: COMPLETED | OUTPATIENT
Start: 2017-09-13 | End: 2017-09-13

## 2017-09-13 RX ORDER — MOXIFLOXACIN HYDROCHLORIDE TABLETS, 400 MG 400 MG/1
1 TABLET, FILM COATED ORAL
Qty: 5 | Refills: 0 | OUTPATIENT
Start: 2017-09-13 | End: 2017-09-15

## 2017-09-13 RX ORDER — CIPROFLOXACIN LACTATE 400MG/40ML
500 VIAL (ML) INTRAVENOUS ONCE
Qty: 0 | Refills: 0 | Status: COMPLETED | OUTPATIENT
Start: 2017-09-13 | End: 2017-09-13

## 2017-09-13 RX ORDER — METRONIDAZOLE 500 MG
500 TABLET ORAL EVERY 8 HOURS
Qty: 0 | Refills: 0 | Status: DISCONTINUED | OUTPATIENT
Start: 2017-09-13 | End: 2017-09-13

## 2017-09-13 RX ORDER — INFLUENZA VIRUS VACCINE 15; 15; 15; 15 UG/.5ML; UG/.5ML; UG/.5ML; UG/.5ML
0.5 SUSPENSION INTRAMUSCULAR ONCE
Qty: 0 | Refills: 0 | Status: COMPLETED | OUTPATIENT
Start: 2017-09-13 | End: 2017-09-13

## 2017-09-13 RX ADMIN — POTASSIUM PHOSPHATE, MONOBASIC POTASSIUM PHOSPHATE, DIBASIC 85 MILLIMOLE(S): 236; 224 INJECTION, SOLUTION INTRAVENOUS at 09:35

## 2017-09-13 RX ADMIN — INFLUENZA VIRUS VACCINE 0.5 MILLILITER(S): 15; 15; 15; 15 SUSPENSION INTRAMUSCULAR at 16:19

## 2017-09-13 RX ADMIN — Medication 100 MILLIGRAM(S): at 07:20

## 2017-09-13 RX ADMIN — BUDESONIDE AND FORMOTEROL FUMARATE DIHYDRATE 2 PUFF(S): 160; 4.5 AEROSOL RESPIRATORY (INHALATION) at 09:35

## 2017-09-13 RX ADMIN — Medication 500 MILLIGRAM(S): at 15:10

## 2017-09-13 RX ADMIN — Medication 200 MILLIGRAM(S): at 05:27

## 2017-09-13 RX ADMIN — SODIUM CHLORIDE 150 MILLILITER(S): 9 INJECTION INTRAMUSCULAR; INTRAVENOUS; SUBCUTANEOUS at 00:30

## 2017-09-13 RX ADMIN — Medication 100 MILLIGRAM(S): at 00:17

## 2017-09-13 NOTE — DISCHARGE NOTE ADULT - MEDICATION SUMMARY - MEDICATIONS TO TAKE
I will START or STAY ON the medications listed below when I get home from the hospital:    Flagyl 500 mg oral tablet  -- 1 tab(s) by mouth every 8 hours   -- Do not drink alcoholic beverages when taking this medication.  Finish all this medication unless otherwise directed by prescriber.  May discolor urine or feces.    -- Indication: For Colitis    Crestor 5 mg oral tablet  -- 1 tab(s) by mouth once a day (at bedtime)  -- Indication: For Hyperlipidemia    Symbicort 160 mcg-4.5 mcg/inh inhalation aerosol  -- 2 puff(s) inhaled 2 times a day  -- Indication: For asthma    hydroCHLOROthiazide 25 mg oral tablet  -- 1 tab(s) by mouth once a day  -- Indication: For Hypertension    Cipro 500 mg oral tablet  -- 1 tab(s) by mouth 2 times a day   -- Avoid prolonged or excessive exposure to direct and/or artificial sunlight while taking this medication.  Check with your doctor before becoming pregnant.  Do not take dairy products, antacids, or iron preparations within one hour of this medication.  Finish all this medication unless otherwise directed by prescriber.  Medication should be taken with plenty of water.    -- Indication: For Colitis

## 2017-09-13 NOTE — DISCHARGE NOTE ADULT - CARE PLAN
Principal Discharge DX:	Colitis  Goal:	Treatment Plan  Instructions for follow-up, activity and diet:	You presented to the emergency room with abdominal pain with associated nausea and vomiting; you had a CT scan which showed evidence of colitis, inflammation of your large intestines. You were started on antibiotics, which you should continue to take for another three days.   You were started on IV fluids, given pain medications and anti-nausea medications to manage your symptoms. Given your diarrhea and decreased oral intake, some of your electrolytes were abnormal, specifically potassium, which was adequately repleated. Your diet was advanced and you are currently able to eat; you have improvement in your symptoms. Please follow up with your primary care provider, Dr. Carly Dawson 1-2 weeks after discharge from the hospital.  Secondary Diagnosis:	Essential hypertension  Instructions for follow-up, activity and diet:	Your home antihypertensive medications were held while you were in the hospital. Please resume your medications as prescribed.  lease follow up with your primary care provider, Dr. Carly Dawson 1-2 weeks after discharge from the hospital.  Secondary Diagnosis:	History of asthma  Instructions for follow-up, activity and diet:	Please continue to take your medications as prescribed and follow up with your primary care provider, Dr. Carly Dawson 1-2 weeks after discharge from the hospital. Principal Discharge DX:	Colitis  Goal:	Treatment Plan  Instructions for follow-up, activity and diet:	You presented to the emergency room with abdominal pain with associated nausea and vomiting; you had a CT scan which showed evidence of colitis, inflammation of your large intestines. You were started on antibiotics, which you should continue to take for another two days (as prescribed).   You were started on IV fluids, given pain medications and anti-nausea medications to manage your symptoms. Given your diarrhea and decreased oral intake, some of your electrolytes were abnormal, specifically potassium, which was adequately repleated. Your diet was advanced and you are currently able to eat; you have improvement in your symptoms.   Please follow up with your primary care provider at [facility] [phone] [address] [place] [address] in 1-2 weeks after discharge from the hospital.  Secondary Diagnosis:	Essential hypertension  Instructions for follow-up, activity and diet:	Your home antihypertensive medications were held while you were in the hospital. Please resume your medications as prescribed.  Please follow up with your primary care provider at [facility] [phone] [address] [place] [address] in 1-2 weeks after discharge from the hospital.  Secondary Diagnosis:	History of asthma  Instructions for follow-up, activity and diet:	Please continue to take your medications as prescribed.  Please follow up with your primary care provider at [facility] [phone] [address] [place] [address] in 1-2 weeks after discharge from the hospital.

## 2017-09-13 NOTE — DISCHARGE NOTE ADULT - ADDITIONAL INSTRUCTIONS
Please follow up with your primary care provider at [facility] [phone] [address] [place] [address] in 1-2 weeks after discharge from the hospital.

## 2017-09-13 NOTE — PROGRESS NOTE ADULT - ASSESSMENT
58 y/o M w/
56 y/o M w/
58 y/o M w/ PMHx of Asthma, HTN, HLD presenting w/ diffuse abdominal pain, nausea, watery, non-bloody diarrhea 2/2 to infectious/inflammatory colitis.

## 2017-09-13 NOTE — DISCHARGE NOTE ADULT - PROVIDER TOKENS
FREE:[LAST:[Eunice QUEZADA],FIRST:[Carly],PHONE:[phone]],FAX:[(   )    -],ADDRESS:[[address] [place], [place] [address] [address]]] FREE:[LAST:[Eunice QUEZADA],FIRST:[Carly],PHONE:[phone]:[(   )    -],ADDRESS:[[address] [place], [place] [address] [address]]],FREE:[LAST:[[facility]],PHONE:[phone]],FAX:[(   )    -],ADDRESS:[[address] [place] [address], [address] [place] [address]]]

## 2017-09-13 NOTE — DISCHARGE NOTE ADULT - CARE PROVIDER_API CALL
Eunice QUEZADA, [place] [address] [place] [address]  Phone: [phone]  Fax: (       - Eunice QUEZADA, [place] [address] [facility] [address]  [place] [address]  Phone: [phone]  Fax: (   )    -    [facility],   [address] [place] [address], [address] [place] [address]  Phone: [phone]  Fax: (   )    -

## 2017-09-13 NOTE — PROGRESS NOTE ADULT - PROBLEM SELECTOR PLAN 4
Per history, appears intermittent. Never been hospitalized/intubated. Currenlty stable, no wheezing on exam, saturating well on RA.  - c/w symbicort 2 puffs BID
cont. Symbicort
cont. Symbicort

## 2017-09-13 NOTE — PROGRESS NOTE ADULT - PROBLEM SELECTOR PLAN 1
POA, d/t infectious colitis; cont. Cipro + Flagyl (day #3/5), supportive care, advance (bland) diet as tolerated, f/u cultures and stool studies (NGTD)
POA, d/t infectious colitis; cont. Cipro + Flagyl (day #2/5), supportive care, advance diet as tolerated, f/u cultures and stool studies
2/2 to infectious vs. inflammatory colitis. Febrile O/N Tm102.8, WBC 8.8 (49% bandemia on admission).  BCx negative @12h. UA negative. s/p 3L NS in the ED  - c/w NS@150 cc/Hr- consider discontinuing once advance diet and tolerating PO   - c/w Cipro 400mg q12hr and Flagyl 500mg q8hrs; consider switching to PO when tolerating  - f/u stool cultures and studies, consider de-escalating as clinically appropriate.   -Pain control: Tyelnol PRN, IV morphine 0.5mg q6hrs for severe pain  -Zofran 4mg q6hrs for nausea

## 2017-09-13 NOTE — PROGRESS NOTE ADULT - PROBLEM SELECTOR PLAN 2
held HCTZ in setting of sepsis; can restart
holding HCTZ in setting of sepsis; restart as BP allows
Persistent hypoK, 2/2 GI loss w/ continuous diarrhea. >5episodes last 24h. S/p 10meq IVx3 and 40 meq PO overnight. AM 3.5. ECG unchanged.   - continue to replete and monitor BMP

## 2017-09-13 NOTE — DISCHARGE NOTE ADULT - PLAN OF CARE
Treatment Plan You presented to the emergency room with abdominal pain with associated nausea and vomiting; you had a CT scan which showed evidence of colitis, inflammation of your large intestines. You were started on antibiotics, which you should continue to take for another three days.   You were started on IV fluids, given pain medications and anti-nausea medications to manage your symptoms. Given your diarrhea and decreased oral intake, some of your electrolytes were abnormal, specifically potassium, which was adequately repleated. Your diet was advanced and you are currently able to eat; you have improvement in your symptoms. Please follow up with your primary care provider, Dr. Carly Dawson 1-2 weeks after discharge from the hospital. Your home antihypertensive medications were held while you were in the hospital. Please resume your medications as prescribed.  lease follow up with your primary care provider, Dr. Carly Dawson 1-2 weeks after discharge from the hospital. Please continue to take your medications as prescribed and follow up with your primary care provider, Dr. Carly Dawson 1-2 weeks after discharge from the hospital. You presented to the emergency room with abdominal pain with associated nausea and vomiting; you had a CT scan which showed evidence of colitis, inflammation of your large intestines. You were started on antibiotics, which you should continue to take for another two days (as prescribed).   You were started on IV fluids, given pain medications and anti-nausea medications to manage your symptoms. Given your diarrhea and decreased oral intake, some of your electrolytes were abnormal, specifically potassium, which was adequately repleated. Your diet was advanced and you are currently able to eat; you have improvement in your symptoms.   Please follow up with your primary care provider at [facility] [phone] [address] [place] [address], [address] [place] [address] in 1-2 weeks after discharge from the hospital. Your home antihypertensive medications were held while you were in the hospital. Please resume your medications as prescribed.  Please follow up with your primary care provider at [facility] [phone] [address] [place] [address], [address] [place] [address] in 1-2 weeks after discharge from the hospital. Please continue to take your medications as prescribed.  Please follow up with your primary care provider at [facility] [phone] [address] [place] [address], [address] [place] [address] in 1-2 weeks after discharge from the hospital.

## 2017-09-13 NOTE — DISCHARGE NOTE ADULT - PATIENT PORTAL LINK FT
“You can access the [employer] Patient Portal, offered by [facility], by registering with the following website: [email or web address]”

## 2017-09-13 NOTE — PROGRESS NOTE ADULT - SUBJECTIVE AND OBJECTIVE BOX
Patient is a 57y old  Male who presents with a chief complaint of Diarrhea (13 Sep 2017 10:52)      INTERVAL HPI/OVERNIGHT EVENTS:    Pt. seen and examined at 11:45AM  Pt. feels better; c/o less "crampy" abdominal pain and less diarrhea, more formed; no F/C, N/V  Tolerating P.O.    Review of Systems: 12 point review of systems otherwise negative    MEDICATIONS  (STANDING):  buDESOnide 160 MICROgram(s)/formoterol 4.5 MICROgram(s) Inhaler 2 Puff(s) Inhalation two times a day  atorvastatin 20 milliGRAM(s) Oral at bedtime  potassium chloride  10 mEq/50 mL IVPB 10 milliEquivalent(s) IV Intermittent once  metroNIDAZOLE    Tablet 500 milliGRAM(s) Oral every 8 hours    MEDICATIONS  (PRN):  acetaminophen   Tablet 650 milliGRAM(s) Oral every 6 hours PRN For Temp greater than 38 C (100.4 F)  ondansetron Injectable 4 milliGRAM(s) IV Push every 6 hours PRN Nausea and/or Vomiting  morphine  - Injectable 0.5 milliGRAM(s) IV Push every 6 hours PRN Moderate Pain (4 - 6)      Allergies    No Known Allergies    Intolerances          Vital Signs Last 24 Hrs  T(C): 37.3 (13 Sep 2017 14:42), Max: 37.3 (13 Sep 2017 14:42)  T(F): 99.1 (13 Sep 2017 14:42), Max: 99.1 (13 Sep 2017 14:42)  HR: 66 (13 Sep 2017 14:42) (63 - 67)  BP: 133/84 (13 Sep 2017 14:42) (63/- - 148/91)  BP(mean): --  RR: 16 (13 Sep 2017 14:42) (16 - 17)  SpO2: 98% (13 Sep 2017 14:42) (98% - 99%)  CAPILLARY BLOOD GLUCOSE          09-12 @ 07:01  -  09-13 @ 07:00  --------------------------------------------------------  IN: 2160 mL / OUT: 2275 mL / NET: -115 mL    09-13 @ 07:01 - 09-13 @ 15:37  --------------------------------------------------------  IN: 1240 mL / OUT: 900 mL / NET: 340 mL        Physical Exam:  (at 11:45AM)  Daily     Daily   General:  well-appearing  HEENT:  anicteric, MMM  Abdomen:  soft NT ND  Extremities:  no edema B/L LE  Skin:  WWP  Neuro:  AAOx3    LABS:                        11.6   6.0   )-----------( 220      ( 13 Sep 2017 06:51 )             34.8     09-13    138  |  102  |  7   ----------------------------<  147<H>  3.6   |  22  |  0.90    Ca    8.9      13 Sep 2017 06:51  Phos  1.7     09-13  Mg     2.0     09-13              RADIOLOGY & ADDITIONAL TESTS:    ---------------------------------------------------------------------------  I personally reviewed: [  ]EKG   [  ]CXR    [  ] CT    [  ]Other  ---------------------------------------------------------------------------  PLEASE CHECK WHEN PRESENT:     [  ]Heart Failure     [  ] Acute     [  ] Acute on Chronic     [  ] Chronic  -------------------------------------------------------------------     [  ]Diastolic [HFpEF]     [  ]Systolic [HFrEF]     [  ]Combined [HFpEF & HFrEF]     [  ]Other:  -------------------------------------------------------------------  [  ]BRANDON     [  ]ATN     [  ]Reneal Medullary Necrosis     [  ]Renal Cortical Necrosis     [  ]Other Pathological Lesions:    [  ]CKD 1  [  ]CKD 2  [  ]CKD 3  [  ]CKD 4  [  ]CKD 5  [  ]Other  -------------------------------------------------------------------  [  ]Other/Unspecified:    --------------------------------------------------------------------    Abdominal Nutritional Status  [  ]Malnutrition: See Nutrition Note  [  ]Cachexia  [  ]Other:   [  ]Supplement Ordered:  [  ]Morbid Obesity (BMI >=40]
Patient is a 57y old  Male who presents with a chief complaint of Diarrhea (11 Sep 2017 08:42)      INTERVAL HPI/OVERNIGHT EVENTS:    Pt. seen and examined at 12:30PM  Pt. feels better overall; c/o less diarrhea and "crampy" abdominal pain, controlled w/ rx; F/C resolved; no N/V  Hasn't had much P.O.    Review of Systems: 12 point review of systems otherwise negative    MEDICATIONS  (STANDING):  buDESOnide 160 MICROgram(s)/formoterol 4.5 MICROgram(s) Inhaler 2 Puff(s) Inhalation two times a day  atorvastatin 20 milliGRAM(s) Oral at bedtime  ciprofloxacin   IVPB 400 milliGRAM(s) IV Intermittent every 12 hours  metroNIDAZOLE  IVPB 500 milliGRAM(s) IV Intermittent every 8 hours  sodium chloride 0.9%. 1000 milliLiter(s) (150 mL/Hr) IV Continuous <Continuous>  potassium chloride  10 mEq/50 mL IVPB 10 milliEquivalent(s) IV Intermittent once  acetaminophen   Tablet. 650 milliGRAM(s) Oral once    MEDICATIONS  (PRN):  acetaminophen   Tablet 650 milliGRAM(s) Oral every 6 hours PRN For Temp greater than 38 C (100.4 F)  ondansetron Injectable 4 milliGRAM(s) IV Push every 6 hours PRN Nausea and/or Vomiting  morphine  - Injectable 0.5 milliGRAM(s) IV Push every 6 hours PRN Moderate Pain (4 - 6)      Allergies    No Known Allergies    Intolerances          Vital Signs Last 24 Hrs  T(C): 36.9 (12 Sep 2017 14:18), Max: 39.3 (11 Sep 2017 17:20)  T(F): 98.4 (12 Sep 2017 14:18), Max: 102.8 (11 Sep 2017 17:20)  HR: 76 (12 Sep 2017 14:18) (71 - 96)  BP: 138/75 (12 Sep 2017 14:18) (107/57 - 138/75)  BP(mean): --  RR: 18 (12 Sep 2017 14:18) (17 - 18)  SpO2: 98% (12 Sep 2017 14:18) (96% - 100%)  CAPILLARY BLOOD GLUCOSE           @ 07:01  -   @ 07:00  --------------------------------------------------------  IN: 1250 mL / OUT: 0 mL / NET: 1250 mL     @ 07:01  -   @ 14:25  --------------------------------------------------------  IN: 0 mL / OUT: 675 mL / NET: -675 mL        Physical Exam:  (at 12:30PM)  Daily     Daily   General:  non-toxic and comfortable-appearing in NAD  HEENT:  anicteric, MMM  Abdomen:  soft NT ND  Skin:  WWP  Neuro:  AAOx3    LABS:                        11.1   8.8   )-----------( 201      ( 12 Sep 2017 06:20 )             33.6         138  |  102  |  7   ----------------------------<  90  3.5   |  21<L>  |  1.00    Ca    8.3<L>      12 Sep 2017 06:20  Phos  2.4       Mg     1.9         TPro  7.8  /  Alb  4.3  /  TBili  0.7  /  DBili  x   /  AST  91<H>  /  ALT  35  /  AlkPhos  47  -11      Urinalysis Basic - ( 11 Sep 2017 03:37 )    Color: Yellow / Appearance: Clear / S.010 / pH: x  Gluc: x / Ketone: 15 mg/dL  / Bili: NEGATIVE / Urobili: 0.2 E.U./dL   Blood: x / Protein: NEGATIVE mg/dL / Nitrite: NEGATIVE   Leuk Esterase: NEGATIVE / RBC: < 5 /HPF / WBC < 5 /HPF   Sq Epi: x / Non Sq Epi: Few /HPF / Bacteria: x          RADIOLOGY & ADDITIONAL TESTS:    ---------------------------------------------------------------------------  I personally reviewed: [  ]EKG   [  ]CXR    [  ] CT    [  ]Other  ---------------------------------------------------------------------------  PLEASE CHECK WHEN PRESENT:     [  ]Heart Failure     [  ] Acute     [  ] Acute on Chronic     [  ] Chronic  -------------------------------------------------------------------     [  ]Diastolic [HFpEF]     [  ]Systolic [HFrEF]     [  ]Combined [HFpEF & HFrEF]     [  ]Other:  -------------------------------------------------------------------  [  ]BRANDON     [  ]ATN     [  ]Reneal Medullary Necrosis     [  ]Renal Cortical Necrosis     [  ]Other Pathological Lesions:    [  ]CKD 1  [  ]CKD 2  [  ]CKD 3  [  ]CKD 4  [  ]CKD 5  [  ]Other  -------------------------------------------------------------------  [  ]Other/Unspecified:    --------------------------------------------------------------------    Abdominal Nutritional Status  [  ]Malnutrition: See Nutrition Note  [  ]Cachexia  [  ]Other:   [  ]Supplement Ordered:  [  ]Morbid Obesity (BMI >=40]
OVERNIGHT EVENTS: LURDES O/N  T 101.5 (oral) overnight, defervesced with Tylenol. (-) rigors, chills at that time. VS otherwise stable. BCx negative at 12h  Hypokalemia has been hard to replete, given 10meq IV x3 overnight with AM K 3.5    SUBJECTIVE / INTERVAL HPI: Patient seen and examined at bedside.   Patient had 4 episodes, watery, nonbloody, non foul smelling diarrhea overnight and 3 in the AM (7AM-9AM). Patient notes improvement in abd pain since yesterday, deny rigors/chills. pain relieved with defecation and returns shortly afterwards, constant 5/10 in intensity. c/o mild nausea (-) vomit, but was eating at bedside during exam.  Denies CP palpitations SOB HA weakness, numbness, tingling    VITAL SIGNS:  Vital Signs Last 24 Hrs  T(C): 37.5 (12 Sep 2017 08:36), Max: 39.3 (11 Sep 2017 17:20)  T(F): 99.5 (12 Sep 2017 08:36), Max: 102.8 (11 Sep 2017 17:20)  HR: 71 (12 Sep 2017 08:36) (71 - 96)  BP: 130/70 (12 Sep 2017 08:36) (107/57 - 134/86)  BP(mean): --  RR: 17 (12 Sep 2017 08:36) (17 - 18)  SpO2: 99% (12 Sep 2017 08:36) (96% - 100%)    PHYSICAL EXAM:    General: NAD, patient sitting up eating breakfast.   HEENT: NC/AT; PERRL, anicteric sclera; MMM  Neck: supple, no jvd  Cardiovascular: +S1/S2; RRR  Respiratory: CTA B/L; no W/R/R  Gastrointestinal: soft, non-distended. Mildly tender to deep palpation in epigastric region, (-) rebound (-) guarding. Normal bowel sounds throughout.   Extremities: WWP; no edema, clubbing or cyanosis  Vascular: 2+ radial, DP/PT pulses B/L  Neurological: AAOx3; no focal deficits    MEDICATIONS:  MEDICATIONS  (STANDING):  buDESOnide 160 MICROgram(s)/formoterol 4.5 MICROgram(s) Inhaler 2 Puff(s) Inhalation two times a day  atorvastatin 20 milliGRAM(s) Oral at bedtime  ciprofloxacin   IVPB 400 milliGRAM(s) IV Intermittent every 12 hours  metroNIDAZOLE  IVPB 500 milliGRAM(s) IV Intermittent every 8 hours  sodium chloride 0.9%. 1000 milliLiter(s) (150 mL/Hr) IV Continuous <Continuous>  potassium chloride  10 mEq/50 mL IVPB 10 milliEquivalent(s) IV Intermittent once    MEDICATIONS  (PRN):  acetaminophen   Tablet 650 milliGRAM(s) Oral every 6 hours PRN For Temp greater than 38 C (100.4 F)  ondansetron Injectable 4 milliGRAM(s) IV Push every 6 hours PRN Nausea and/or Vomiting  morphine  - Injectable 0.5 milliGRAM(s) IV Push every 6 hours PRN Moderate Pain (4 - 6)    ALLERGIES:  Allergies    No Known Allergies    Intolerance    LABS:                        11.1   8.8   )-----------( 201      ( 12 Sep 2017 06:20 )             33.6     -12    138  |  102  |  7   ----------------------------<  90  3.5   |  21<L>  |  1.00    Ca    8.3<L>      12 Sep 2017 06:20  Phos  2.4     -  Mg     1.9         TPro  7.8  /  Alb  4.3  /  TBili  0.7  /  DBili  x   /  AST  91<H>  /  ALT  35  /  AlkPhos  47  09-11      Urinalysis Basic - ( 11 Sep 2017 03:37 )    Color: Yellow / Appearance: Clear / S.010 / pH: x  Gluc: x / Ketone: 15 mg/dL  / Bili: NEGATIVE / Urobili: 0.2 E.U./dL   Blood: x / Protein: NEGATIVE mg/dL / Nitrite: NEGATIVE   Leuk Esterase: NEGATIVE / RBC: < 5 /HPF / WBC < 5 /HPF   Sq Epi: x / Non Sq Epi: Few /HPF / Bacteria: x      RADIOLOGY & ADDITIONAL TESTS: Reviewed.

## 2017-09-13 NOTE — PROGRESS NOTE ADULT - PROBLEM SELECTOR PROBLEM 4
Uncomplicated asthma, unspecified asthma severity
Uncomplicated asthma, unspecified asthma severity
History of asthma

## 2017-09-13 NOTE — DISCHARGE NOTE ADULT - HOSPITAL COURSE
57y M w/ PMHx of Asthma, HTN, HLD presented to [facility] after acute onset 10/10 abdominal pain with associated NB diarrhea. 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 In the ED, T: 102.9, , /70, RR 18, O2 98% WBC 12.0 (Sepsis), K 3.2, with CT evidence of infectious/inflammatory colitis of the ascending and proximal transverse colon. Pt was noted to have witnessed chills/rigors in the ED. Patient given 3L NS, started on cipro and flagyl, 0.5mg morphine, Zofran and transferred to the floor for further management. Pt was difficult to replete with potassium given diarrhea. He was continued on IVF, cipro and flagyl, with noted improvement in abdominal pain (decreasing PRN med needs) and improving diarrhea. Patient's diet was advanced and electrolytes, specifically K, adequately repleated. Blood cultures and stool cultures have been negative. He has remained HD stable, been tolerating PO diet, clinically improved with decreased abd pain (4/10 from 9/10) and decreased number episodes diarrhea. Patient is safe for discharge home with plan for another three days of antibiotics and f/u with PCP Dr. Carly Bajwa ([phone]). 57y M w/ PMHx of Asthma, HTN, HLD presented to [facility] after acute onset 10/10 abdominal pain with associated NB diarrhea. 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 In the ED, T: 102.9, , /70, RR 18, O2 98% WBC 12.0 (Sepsis), K 3.2, with CT evidence of infectious/inflammatory colitis of the ascending and proximal transverse colon. Pt was noted to have witnessed chills/rigors in the ED. Patient given 3L NS, started on cipro and flagyl, 0.5mg morphine, Zofran and transferred to the floor for further management. Pt was difficult to replete with potassium given diarrhea. He was continued on IVF, cipro and flagyl, with noted improvement in abdominal pain (decreasing PRN med needs) and improving diarrhea. Patient's diet was advanced and electrolytes, specifically K, adequately repleated. Blood cultures and stool cultures have been negative. He has remained HD stable, been tolerating PO diet, clinically improved with decreased abd pain (4/10 from 9/10) and decreased number episodes diarrhea. Patient is safe for discharge home with plan for another three days of antibiotics and f/u with [facility] 1-2 weeks after discharge from the hospital.

## 2017-09-13 NOTE — PROGRESS NOTE ADULT - PROBLEM SELECTOR PLAN 5
- c/w atorvastatin 20mg PO daily.
replete; HCTZ held as above
repleted; d/t diarrhea, which is resolving; can restart HCTZ

## 2017-09-14 LAB — CULTURE RESULTS: SIGNIFICANT CHANGE UP

## 2017-09-15 DIAGNOSIS — A41.9 SEPSIS, UNSPECIFIED ORGANISM: ICD-10-CM

## 2017-09-15 DIAGNOSIS — E78.5 HYPERLIPIDEMIA, UNSPECIFIED: ICD-10-CM

## 2017-09-15 DIAGNOSIS — E87.6 HYPOKALEMIA: ICD-10-CM

## 2017-09-15 DIAGNOSIS — A04.5 CAMPYLOBACTER ENTERITIS: ICD-10-CM

## 2017-09-15 DIAGNOSIS — I10 ESSENTIAL (PRIMARY) HYPERTENSION: ICD-10-CM

## 2017-09-15 DIAGNOSIS — K52.9 NONINFECTIVE GASTROENTERITIS AND COLITIS, UNSPECIFIED: ICD-10-CM

## 2017-09-15 DIAGNOSIS — J45.909 UNSPECIFIED ASTHMA, UNCOMPLICATED: ICD-10-CM

## 2017-09-15 LAB
-  CIPROFLOXACIN: SIGNIFICANT CHANGE UP
-  ERYTHROMYCIN: SIGNIFICANT CHANGE UP
-  TETRACYCLINE: SIGNIFICANT CHANGE UP
CULTURE RESULTS: SIGNIFICANT CHANGE UP
METHOD TYPE: SIGNIFICANT CHANGE UP
ORGANISM # SPEC MICROSCOPIC CNT: SIGNIFICANT CHANGE UP
ORGANISM # SPEC MICROSCOPIC CNT: SIGNIFICANT CHANGE UP
SPECIMEN SOURCE: SIGNIFICANT CHANGE UP

## 2017-09-16 LAB
CULTURE RESULTS: SIGNIFICANT CHANGE UP
CULTURE RESULTS: SIGNIFICANT CHANGE UP
SPECIMEN SOURCE: SIGNIFICANT CHANGE UP
SPECIMEN SOURCE: SIGNIFICANT CHANGE UP

## 2017-09-18 LAB
CULTURE RESULTS: SIGNIFICANT CHANGE UP
SPECIMEN SOURCE: SIGNIFICANT CHANGE UP

## 2017-10-02 NOTE — PROGRESS NOTE ADULT - PROBLEM SELECTOR PLAN 3
Patient needs a FU for further valium refills, per [facility] policy       reviewed from state pharmacy database-Medications found to be medically necessary/appropriate.    
Was the patient seen in the last year in this department? Yes     Does patient have an active prescription for medications requested? Yes     Received Request Via: Pharmacy  
cont. statin
cont. statin
Patient currently normotensive, home HCTZ 25mg HELD in setting of sepsis, HypoK  - continue to monitor and consider adding antiHTN if BP increases
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
## 2020-03-17 ENCOUNTER — EMERGENCY (EMERGENCY)
Facility: HOSPITAL | Age: 60
LOS: 1 days | Discharge: HOME | End: 2020-03-17
Admitting: EMERGENCY MEDICINE
Payer: COMMERCIAL

## 2020-03-17 VITALS
RESPIRATION RATE: 18 BRPM | SYSTOLIC BLOOD PRESSURE: 145 MMHG | HEIGHT: 68 IN | DIASTOLIC BLOOD PRESSURE: 81 MMHG | HEART RATE: 74 BPM | TEMPERATURE: 98 F | WEIGHT: 154.98 LBS | OXYGEN SATURATION: 99 %

## 2020-03-17 DIAGNOSIS — J39.2 OTHER DISEASES OF PHARYNX: ICD-10-CM

## 2020-03-17 DIAGNOSIS — Z82.49 FAMILY HISTORY OF ISCHEMIC HEART DISEASE AND OTHER DISEASES OF THE CIRCULATORY SYSTEM: Chronic | ICD-10-CM

## 2020-03-17 PROCEDURE — 70490 CT SOFT TISSUE NECK W/O DYE: CPT | Mod: 26

## 2020-03-17 PROCEDURE — 70360 X-RAY EXAM OF NECK: CPT | Mod: 26

## 2020-03-17 PROCEDURE — 70360 X-RAY EXAM OF NECK: CPT

## 2020-03-17 PROCEDURE — 70490 CT SOFT TISSUE NECK W/O DYE: CPT

## 2020-03-17 PROCEDURE — 99284 EMERGENCY DEPT VISIT MOD MDM: CPT | Mod: 25

## 2020-03-17 PROCEDURE — 99284 EMERGENCY DEPT VISIT MOD MDM: CPT

## 2020-03-17 NOTE — ED PROVIDER NOTE - ENMT, MLM
Airway patent, Nasal mucosa clear. Mouth with normal mucosa. Throat has no vesicles, no oropharyngeal exudates and uvula is midline, no foreign body seen.

## 2020-03-17 NOTE — ED PROVIDER NOTE - OBJECTIVE STATEMENT
58 y/o M with PMHx HTN and asthma presents to the ED with possible foreign body in his throat. Pt states he was eating chicken earlier and believes a bone got wedged in his throat. pt tried to cough the bone out, but does not think it came out. Pt was able to eat rice afterwards, but still notes some discomfort in his throat. He denies the following: nausea, vomiting, changes in voice or difficulty swallowing. 58 y/o M with PMHx HTN and asthma presents to the ED with foreign body sensation in his throat. Pt states he was eating chicken earlier and believes a bone got wedged in his throat. pt tried to cough the bone out, but does not think it came out. Pt was able to eat rice afterwards, but still notes some discomfort in his throat. Later was coughing up blood. Every time he swallows, the fb sensation increased. He denies the following: nausea, vomiting, changes in voice or difficulty swallowing.

## 2020-03-17 NOTE — ED PROVIDER NOTE - PATIENT PORTAL LINK FT
You can access the FollowMyHealth Patient Portal offered by [facility] by registering at the following website: [email or web address]. By joining [facility] FollowMyHealth portal, you will also be able to view your health information using other applications (apps) compatible with our system.

## 2020-03-17 NOTE — ED ADULT NURSE REASSESSMENT NOTE - NS ED NURSE REASSESS COMMENT FT1
Xray and CT scan done, no new symptom complaint, fluids PO tolerated well, discharged to home in stable condition.

## 2020-03-17 NOTE — ED ADULT TRIAGE NOTE - CHIEF COMPLAINT QUOTE
pt states " I was eating and I feel like a chicken bone is stuck in my throat" pt able to speak clear and in full sentences, no drooling noted, NAD.

## 2020-03-17 NOTE — ED PROVIDER NOTE - CARE PROVIDERS DIRECT ADDRESSES
[email or web address]

## 2020-03-17 NOTE — ED PROVIDER NOTE - CARE PROVIDER_API CALL
Omar Robles)  Otolaryngology  [address] [place] [address], [address] [place] [address]  Phone: [phone]  Fax: [phone]  Follow Up Time:     Jada Tavera)  Otolaryngology  [address] [place] [address], [address] [place] [address]  Phone: [phone]  Fax: [phone]  Follow Up Time:

## 2020-03-17 NOTE — ED ADULT NURSE NOTE - OBJECTIVE STATEMENT
Patient states was eating chicken this afternoon when felt swallowed a bone, feels something stuck in throat, no difficulty speaking in long sentences, no choking or drooling, no voice hoarseness.

## 2020-03-17 NOTE — ED PROVIDER NOTE - CLINICAL SUMMARY MEDICAL DECISION MAKING FREE TEXT BOX
58 y/o M with PMHx HTN and asthma presents ot the ED with possible foreign body in throat with associated with discomfort after accidentally swallowing a chicken bone earlier today; no associated nausea, vomiting, changes in voice or difficulty swallowing. Plan for xrays to r/o foreign body. 58 y/o M with PMHx HTN and asthma presents ot the ED with possible foreign body in throat with associated with discomfort after accidentally swallowing a chicken bone earlier today; no associated nausea, vomiting, changes in voice or difficulty swallowing. Plan for xrays to r/o foreign body. Xray and CT negative. Pt tolerating PO. Speaking in full sentences. Discussed with ENT. Refrain from tylenol and steroids. Given return precautions if pt develops fever or fatigue to the return the ED, otherwise if symptoms persist to follow up with ENT in one week.

## 2020-03-17 NOTE — ED PROVIDER NOTE - NSFOLLOWUPINSTRUCTIONS_ED_ALL_ED_FT
Please continue consuming solid and liquid as tolerated. Please refrain from using motrin or tylenol. If you develop fever, fatigue, choking sensation or difficulty breathing, please return the ED. Otherwise, please follow up with ENT in one week if symptoms persists.

## 2021-03-27 ENCOUNTER — EMERGENCY (EMERGENCY)
Facility: HOSPITAL | Age: 61
LOS: 1 days | Discharge: HOME | End: 2021-03-27
Attending: EMERGENCY MEDICINE | Admitting: EMERGENCY MEDICINE
Payer: COMMERCIAL

## 2021-03-27 VITALS
HEART RATE: 61 BPM | RESPIRATION RATE: 17 BRPM | SYSTOLIC BLOOD PRESSURE: 133 MMHG | OXYGEN SATURATION: 99 % | TEMPERATURE: 98 F | DIASTOLIC BLOOD PRESSURE: 79 MMHG

## 2021-03-27 VITALS
RESPIRATION RATE: 18 BRPM | SYSTOLIC BLOOD PRESSURE: 147 MMHG | TEMPERATURE: 98 F | WEIGHT: 154.98 LBS | DIASTOLIC BLOOD PRESSURE: 85 MMHG | OXYGEN SATURATION: 98 % | HEART RATE: 88 BPM | HEIGHT: 68 IN

## 2021-03-27 DIAGNOSIS — Z82.49 FAMILY HISTORY OF ISCHEMIC HEART DISEASE AND OTHER DISEASES OF THE CIRCULATORY SYSTEM: Chronic | ICD-10-CM

## 2021-03-27 DIAGNOSIS — I49.1 ATRIAL PREMATURE DEPOLARIZATION: ICD-10-CM

## 2021-03-27 DIAGNOSIS — E78.5 HYPERLIPIDEMIA, UNSPECIFIED: ICD-10-CM

## 2021-03-27 DIAGNOSIS — J45.909 UNSPECIFIED ASTHMA, UNCOMPLICATED: ICD-10-CM

## 2021-03-27 DIAGNOSIS — R00.2 PALPITATIONS: ICD-10-CM

## 2021-03-27 DIAGNOSIS — Z79.51 LONG TERM (CURRENT) USE OF INHALED STEROIDS: ICD-10-CM

## 2021-03-27 DIAGNOSIS — I10 ESSENTIAL (PRIMARY) HYPERTENSION: ICD-10-CM

## 2021-03-27 LAB
ALBUMIN SERPL ELPH-MCNC: 4.1 G/DL — SIGNIFICANT CHANGE UP (ref 3.3–5)
ALP SERPL-CCNC: 68 U/L — SIGNIFICANT CHANGE UP (ref 40–120)
ALT FLD-CCNC: 17 U/L — SIGNIFICANT CHANGE UP (ref 10–45)
ANION GAP SERPL CALC-SCNC: 6 MMOL/L — SIGNIFICANT CHANGE UP (ref 5–17)
AST SERPL-CCNC: 23 U/L — SIGNIFICANT CHANGE UP (ref 10–40)
BASOPHILS # BLD AUTO: 0.01 K/UL — SIGNIFICANT CHANGE UP (ref 0–0.2)
BASOPHILS NFR BLD AUTO: 0.2 % — SIGNIFICANT CHANGE UP (ref 0–2)
BILIRUB SERPL-MCNC: 0.3 MG/DL — SIGNIFICANT CHANGE UP (ref 0.2–1.2)
BUN SERPL-MCNC: 11 MG/DL — SIGNIFICANT CHANGE UP (ref 7–23)
CALCIUM SERPL-MCNC: 9.6 MG/DL — SIGNIFICANT CHANGE UP (ref 8.4–10.5)
CHLORIDE SERPL-SCNC: 103 MMOL/L — SIGNIFICANT CHANGE UP (ref 96–108)
CO2 SERPL-SCNC: 33 MMOL/L — HIGH (ref 22–31)
CREAT SERPL-MCNC: 1.06 MG/DL — SIGNIFICANT CHANGE UP (ref 0.5–1.3)
EOSINOPHIL # BLD AUTO: 0.1 K/UL — SIGNIFICANT CHANGE UP (ref 0–0.5)
EOSINOPHIL NFR BLD AUTO: 2.2 % — SIGNIFICANT CHANGE UP (ref 0–6)
GLUCOSE SERPL-MCNC: 107 MG/DL — HIGH (ref 70–99)
HCT VFR BLD CALC: 39.4 % — SIGNIFICANT CHANGE UP (ref 39–50)
HGB BLD-MCNC: 12.9 G/DL — LOW (ref 13–17)
IMM GRANULOCYTES NFR BLD AUTO: 0 % — SIGNIFICANT CHANGE UP (ref 0–1.5)
LYMPHOCYTES # BLD AUTO: 1.05 K/UL — SIGNIFICANT CHANGE UP (ref 1–3.3)
LYMPHOCYTES # BLD AUTO: 23 % — SIGNIFICANT CHANGE UP (ref 13–44)
MAGNESIUM SERPL-MCNC: 2.2 MG/DL — SIGNIFICANT CHANGE UP (ref 1.6–2.6)
MCHC RBC-ENTMCNC: 29.9 PG — SIGNIFICANT CHANGE UP (ref 27–34)
MCHC RBC-ENTMCNC: 32.7 GM/DL — SIGNIFICANT CHANGE UP (ref 32–36)
MCV RBC AUTO: 91.4 FL — SIGNIFICANT CHANGE UP (ref 80–100)
MONOCYTES # BLD AUTO: 0.66 K/UL — SIGNIFICANT CHANGE UP (ref 0–0.9)
MONOCYTES NFR BLD AUTO: 14.5 % — HIGH (ref 2–14)
NEUTROPHILS # BLD AUTO: 2.74 K/UL — SIGNIFICANT CHANGE UP (ref 1.8–7.4)
NEUTROPHILS NFR BLD AUTO: 60.1 % — SIGNIFICANT CHANGE UP (ref 43–77)
NRBC # BLD: 0 /100 WBCS — SIGNIFICANT CHANGE UP (ref 0–0)
PLATELET # BLD AUTO: 222 K/UL — SIGNIFICANT CHANGE UP (ref 150–400)
POTASSIUM SERPL-MCNC: 3.9 MMOL/L — SIGNIFICANT CHANGE UP (ref 3.5–5.3)
POTASSIUM SERPL-SCNC: 3.9 MMOL/L — SIGNIFICANT CHANGE UP (ref 3.5–5.3)
PROT SERPL-MCNC: 7.1 G/DL — SIGNIFICANT CHANGE UP (ref 6–8.3)
RBC # BLD: 4.31 M/UL — SIGNIFICANT CHANGE UP (ref 4.2–5.8)
RBC # FLD: 12.2 % — SIGNIFICANT CHANGE UP (ref 10.3–14.5)
SODIUM SERPL-SCNC: 142 MMOL/L — SIGNIFICANT CHANGE UP (ref 135–145)
TROPONIN T SERPL-MCNC: 0.01 NG/ML — SIGNIFICANT CHANGE UP (ref 0–0.01)
WBC # BLD: 4.56 K/UL — SIGNIFICANT CHANGE UP (ref 3.8–10.5)
WBC # FLD AUTO: 4.56 K/UL — SIGNIFICANT CHANGE UP (ref 3.8–10.5)

## 2021-03-27 PROCEDURE — 96360 HYDRATION IV INFUSION INIT: CPT

## 2021-03-27 PROCEDURE — 71045 X-RAY EXAM CHEST 1 VIEW: CPT

## 2021-03-27 PROCEDURE — 96361 HYDRATE IV INFUSION ADD-ON: CPT

## 2021-03-27 PROCEDURE — 84484 ASSAY OF TROPONIN QUANT: CPT

## 2021-03-27 PROCEDURE — 83735 ASSAY OF MAGNESIUM: CPT

## 2021-03-27 PROCEDURE — 71045 X-RAY EXAM CHEST 1 VIEW: CPT | Mod: 26

## 2021-03-27 PROCEDURE — 36415 COLL VENOUS BLD VENIPUNCTURE: CPT

## 2021-03-27 PROCEDURE — 99285 EMERGENCY DEPT VISIT HI MDM: CPT | Mod: 25

## 2021-03-27 PROCEDURE — 85025 COMPLETE CBC W/AUTO DIFF WBC: CPT

## 2021-03-27 PROCEDURE — 99283 EMERGENCY DEPT VISIT LOW MDM: CPT | Mod: 25

## 2021-03-27 PROCEDURE — 93010 ELECTROCARDIOGRAM REPORT: CPT

## 2021-03-27 PROCEDURE — 80053 COMPREHEN METABOLIC PANEL: CPT

## 2021-03-27 RX ORDER — SODIUM CHLORIDE 9 MG/ML
500 INJECTION INTRAMUSCULAR; INTRAVENOUS; SUBCUTANEOUS ONCE
Refills: 0 | Status: COMPLETED | OUTPATIENT
Start: 2021-03-27 | End: 2021-03-27

## 2021-03-27 RX ADMIN — SODIUM CHLORIDE 500 MILLILITER(S): 9 INJECTION INTRAMUSCULAR; INTRAVENOUS; SUBCUTANEOUS at 03:48

## 2021-03-27 RX ADMIN — SODIUM CHLORIDE 500 MILLILITER(S): 9 INJECTION INTRAMUSCULAR; INTRAVENOUS; SUBCUTANEOUS at 01:52

## 2021-03-27 NOTE — ED PROVIDER NOTE - OBJECTIVE STATEMENT
60M hx htn, c/o palpitations. pt states they started suddenly tonight while he was at rest. states felt funny feeling in chest, like heart was racing and irregular.  no cough, no SOB, no n/v. no lightheadedness. no fevers. no recent travel. no sick contacts. no LE swelling.

## 2021-03-27 NOTE — ED PROVIDER NOTE - NSFOLLOWUPINSTRUCTIONS_ED_ALL_ED_FT
You may benefit from a Holter monitor.      Heart Palpitations    WHAT YOU NEED TO KNOW:    Heart palpitations are feelings that your heart races, jumps, throbs, or flutters. You may feel extra beats, no beats for a short time, or skipped beats. You may have these feelings in your chest, throat, or neck. They may happen when you are sitting, standing, or lying. Heart palpitations may be frightening, but are usually not caused by a serious problem.     DISCHARGE INSTRUCTIONS:    Call 911 or have someone else call for any of the following:   •You have any of the following signs of a heart attack: ?Squeezing, pressure, or pain in your chest    ?You may also have any of the following: ?Discomfort or pain in your back, neck, jaw, stomach, or arm    ?Shortness of breath    ?Nausea or vomiting    ?Lightheadedness or a sudden cold sweat    •You have any of the following signs of a stroke: ?Numbness or drooping on one side of your face     ?Weakness in an arm or leg    ?Confusion or difficulty speaking    ?Dizziness, a severe headache, or vision loss    •You faint or lose consciousness.     Return to the emergency department if:   •Your palpitations happen more often or get more intense.     Contact your healthcare provider if:   •You have new or worsening swelling in your feet or ankles.    •You have questions or concerns about your condition or care.    Follow up with your healthcare provider as directed: You may need to follow up with a cardiologist. You may need tests to check for heart problems that cause palpitations. Write down your questions so you remember to ask them during your visits.     Keep a record: Write down when your palpitations start and stop, what you were doing when they started, and your symptoms. Keep track of what you ate or drank within a few hours of your palpitations. Include anything that seemed to help your symptoms, such as lying down or holding your breath. This record will help you and your healthcare provider learn what triggers your palpitations. Bring this record with you to your follow up visits.    Help prevent heart palpitations:   •Manage stress and anxiety. Find ways to relax such as listening to music, meditating, or doing yoga. Exercise can also help decrease stress and anxiety. Talk to someone you trust about your stress or anxiety. You can also talk to a therapist.     •Get plenty of sleep every night. Ask your healthcare provider how much sleep you need each night.     •Do not drink caffeine or alcohol. Caffeine and alcohol can make your palpitations worse. Caffeine is found in soda, coffee, tea, chocolate, and drinks that increase your energy.     •Do not smoke. Nicotine and other chemicals in cigarettes and cigars may damage your heart and blood vessels. Ask your healthcare provider for information if you currently smoke and need help to quit. E-cigarettes or smokeless tobacco still contain nicotine. Talk to your healthcare provider before you use these products.     •Do not use illegal drugs. Talk to your healthcare provider if you use illegal drugs and want help to quit.

## 2021-03-27 NOTE — ED PROVIDER NOTE - WR ORDER ID 1
Important Medication Changes none      Further testing to be done:none        Next follow up visit: In office in 1 Year              HERE IS SOME IMPORTANT INFORMATION FOR OUR PATIENTS    If you need refills- call your pharmacist and they will contact our office.    If you have medical concerns call    [phone]. ( [facility] Office)                                                          [phone]  ( [place] office)                                                          [phone] ( [facility] office)                                                           [phone]( [facility]  Office)                   If you have a question during office hours call  and to make appointment [phone]. You will eventually speak with my nurses. If you need to speak to me directly, let them know and I will get back to you as soon as possible.  Better yet, you can consider signing up for [employer], our popular online communication portal to schedule an appointment, ask for a refill, or contact our staff. Go to:  [email or web address]    Mane Irving MD  
0023[facility]

## 2021-03-27 NOTE — ED ADULT NURSE NOTE - OBJECTIVE STATEMENT
Patient to the Ed with complaint of palpitations, reported that he woke up with a funny feeling in his chis as if his heart is racing denies chest pain or any other symptoms, hx of HTN.

## 2021-03-27 NOTE — ED PROVIDER NOTE - CARE PROVIDERS DIRECT ADDRESSES
[email or web address]

## 2021-03-27 NOTE — ED PROVIDER NOTE - PATIENT PORTAL LINK FT
You can access the FollowMyHealth Patient Portal offered by [facility] by registering at the following website: [email or web address]. By joining [employer]’s FollowMyHealth portal, you will also be able to view your health information using other applications (apps) compatible with our system.

## 2021-03-27 NOTE — ED ADULT NURSE NOTE - NSIMPLEMENTINTERV_GEN_ALL_ED
Implemented All Universal Safety Interventions:  [place] to call system. Call bell, personal items and telephone within reach. Instruct patient to call for assistance. Room bathroom lighting operational. Non-slip footwear when patient is off stretcher. Physically safe environment: no spills, clutter or unnecessary equipment. Stretcher in lowest position, wheels locked, appropriate side rails in place.

## 2021-03-27 NOTE — ED PROVIDER NOTE - PROGRESS NOTE DETAILS
no tachycardia throughout ED stay, recommend f/u with PMD. pt may benefit from holter monitor  I have discussed the discharge plan with the patient. The patient agrees with the plan, as discussed.  The patient understands Emergency Department diagnosis is a preliminary diagnosis often based on limited information and that the patient must adhere to the follow-up plan as discussed.  The patient understands that if the symptoms worsen the patient may return to the Emergency Department at any time for further evaluation and treatment.

## 2021-03-27 NOTE — ED PROVIDER NOTE - CARE PROVIDER_API CALL
Reese Overton)  Cardiac Electrophysiology; Cardiovascular Disease  [address] [place] [address]  Phone: [phone]  Fax: [phone]  Follow Up Time:     Freddie Plummer)  Cardiac Electrophysiology; Cardiovascular Disease; Internal Medicine  [address] [place] [address]  Phone: [phone]  Fax: [phone]  Follow Up Time:

## 2021-03-27 NOTE — ED ADULT TRIAGE NOTE - OTHER COMPLAINTS
c/o waking up tonight feeling an irregular and fast heart beat, no CP, no SOB at this time. Denies cardiac hx, on HCTZ for HTN

## 2021-05-24 PROBLEM — Z00.00 ENCOUNTER FOR PREVENTIVE HEALTH EXAMINATION: Status: ACTIVE | Noted: 2021-05-24

## 2021-05-25 ENCOUNTER — TRANSCRIPTION ENCOUNTER (OUTPATIENT)
Age: 61
End: 2021-05-25

## 2021-05-25 ENCOUNTER — APPOINTMENT (OUTPATIENT)
Dept: HEART AND VASCULAR | Facility: CLINIC | Age: 61
End: 2021-05-25
Payer: COMMERCIAL

## 2021-05-25 ENCOUNTER — NON-APPOINTMENT (OUTPATIENT)
Age: 61
End: 2021-05-25

## 2021-05-25 VITALS — WEIGHT: 157 LBS | BODY MASS INDEX: 24.64 KG/M2 | HEIGHT: 67 IN

## 2021-05-25 VITALS — TEMPERATURE: 98.5 F | DIASTOLIC BLOOD PRESSURE: 92 MMHG | HEART RATE: 72 BPM | SYSTOLIC BLOOD PRESSURE: 159 MMHG

## 2021-05-25 DIAGNOSIS — Z87.891 PERSONAL HISTORY OF NICOTINE DEPENDENCE: ICD-10-CM

## 2021-05-25 DIAGNOSIS — I10 ESSENTIAL (PRIMARY) HYPERTENSION: ICD-10-CM

## 2021-05-25 DIAGNOSIS — R00.2 PALPITATIONS: ICD-10-CM

## 2021-05-25 DIAGNOSIS — Z82.49 FAMILY HISTORY OF ISCHEMIC HEART DISEASE AND OTHER DISEASES OF THE CIRCULATORY SYSTEM: ICD-10-CM

## 2021-05-25 PROCEDURE — 99204 OFFICE O/P NEW MOD 45 MIN: CPT

## 2021-05-25 PROCEDURE — 93000 ELECTROCARDIOGRAM COMPLETE: CPT

## 2021-05-25 PROCEDURE — 99072 ADDL SUPL MATRL&STAF TM PHE: CPT

## 2021-05-25 RX ORDER — ROSUVASTATIN CALCIUM 5 MG/1
5 TABLET, FILM COATED ORAL
Refills: 0 | Status: ACTIVE | COMMUNITY

## 2021-05-25 RX ORDER — AMLODIPINE BESYLATE 5 MG/1
5 TABLET ORAL
Refills: 0 | Status: ACTIVE | COMMUNITY

## 2021-05-25 RX ORDER — MULTIVITAMIN
TABLET ORAL
Refills: 0 | Status: ACTIVE | COMMUNITY

## 2021-05-25 RX ORDER — HYDROCHLOROTHIAZIDE 25 MG/1
25 TABLET ORAL
Refills: 0 | Status: ACTIVE | COMMUNITY

## 2021-05-25 NOTE — HISTORY OF PRESENT ILLNESS
[FreeTextEntry1] : 59 y/o M with h/o palpitations who presents for evaluation.\par \par Reports he woke up in the middle of the night to urinate.  Came back to bed and laid down and developed palpitations that felt like a rapid, strong heartbeat.  Felt like several rapid beats and then a "jump back" that repeated several times.  Lasted for about 45 minutes.  Presented to [facility] ED but palpitations had resolved at that time.  ECG significant for SR with isolated PAC.  Troponins negative.  He was discharged home with referral to EP.   No history of presyncope/syncope.\par \par He notes one prior episode a few years ago that was very similar.\par \par March 24 received first Pfizer vaccine.  April 14 received second vaccine.

## 2021-06-05 ENCOUNTER — APPOINTMENT (OUTPATIENT)
Dept: HEART AND VASCULAR | Facility: CLINIC | Age: 61
End: 2021-06-05
Payer: COMMERCIAL

## 2021-06-05 PROCEDURE — 93248 EXT ECG>7D<15D REV&INTERPJ: CPT

## 2021-06-05 PROCEDURE — 99072 ADDL SUPL MATRL&STAF TM PHE: CPT

## 2021-07-11 NOTE — H&P ADULT - NSHPSOURCEINFORD_GEN_ALL_CORE
Patient Report received from NOC Rn. Assumed pt care. Pt is resting in bed on 2 L 02. Pt A&O x 4. Fall precautions in place, call light and belongings within reach, bed in lowest position. No signs of distress.

## 2022-05-04 NOTE — PROGRESS NOTE ADULT - PROBLEM SELECTOR PROBLEM 3
Hyperlipidemia, unspecified hyperlipidemia type
Hyperlipidemia, unspecified hyperlipidemia type
Essential hypertension
right heel

## 2023-01-15 ENCOUNTER — EMERGENCY (EMERGENCY)
Facility: HOSPITAL | Age: 63
LOS: 1 days | Discharge: HOME | End: 2023-01-15
Attending: EMERGENCY MEDICINE | Admitting: EMERGENCY MEDICINE
Payer: COMMERCIAL

## 2023-01-15 VITALS
OXYGEN SATURATION: 98 % | RESPIRATION RATE: 16 BRPM | DIASTOLIC BLOOD PRESSURE: 78 MMHG | HEART RATE: 67 BPM | SYSTOLIC BLOOD PRESSURE: 134 MMHG

## 2023-01-15 VITALS
DIASTOLIC BLOOD PRESSURE: 79 MMHG | HEIGHT: 67 IN | SYSTOLIC BLOOD PRESSURE: 152 MMHG | RESPIRATION RATE: 18 BRPM | WEIGHT: 160.06 LBS | TEMPERATURE: 98 F | HEART RATE: 71 BPM | OXYGEN SATURATION: 100 %

## 2023-01-15 DIAGNOSIS — Z82.49 FAMILY HISTORY OF ISCHEMIC HEART DISEASE AND OTHER DISEASES OF THE CIRCULATORY SYSTEM: Chronic | ICD-10-CM

## 2023-01-15 LAB
ALBUMIN SERPL ELPH-MCNC: 4.4 G/DL — SIGNIFICANT CHANGE UP (ref 3.3–5)
ALP SERPL-CCNC: 75 U/L — SIGNIFICANT CHANGE UP (ref 40–120)
ALT FLD-CCNC: 17 U/L — SIGNIFICANT CHANGE UP (ref 10–45)
ANION GAP SERPL CALC-SCNC: 11 MMOL/L — SIGNIFICANT CHANGE UP (ref 5–17)
APTT BLD: 33.9 SEC — SIGNIFICANT CHANGE UP (ref 27.5–35.5)
AST SERPL-CCNC: 24 U/L — SIGNIFICANT CHANGE UP (ref 10–40)
BASOPHILS # BLD AUTO: 0.02 K/UL — SIGNIFICANT CHANGE UP (ref 0–0.2)
BASOPHILS NFR BLD AUTO: 0.4 % — SIGNIFICANT CHANGE UP (ref 0–2)
BILIRUB SERPL-MCNC: 0.6 MG/DL — SIGNIFICANT CHANGE UP (ref 0.2–1.2)
BUN SERPL-MCNC: 13 MG/DL — SIGNIFICANT CHANGE UP (ref 7–23)
CALCIUM SERPL-MCNC: 9.9 MG/DL — SIGNIFICANT CHANGE UP (ref 8.4–10.5)
CHLORIDE SERPL-SCNC: 102 MMOL/L — SIGNIFICANT CHANGE UP (ref 96–108)
CO2 SERPL-SCNC: 28 MMOL/L — SIGNIFICANT CHANGE UP (ref 22–31)
CREAT SERPL-MCNC: 0.93 MG/DL — SIGNIFICANT CHANGE UP (ref 0.5–1.3)
EGFR: 93 ML/MIN/1.73M2 — SIGNIFICANT CHANGE UP
EOSINOPHIL # BLD AUTO: 0.12 K/UL — SIGNIFICANT CHANGE UP (ref 0–0.5)
EOSINOPHIL NFR BLD AUTO: 2.5 % — SIGNIFICANT CHANGE UP (ref 0–6)
GLUCOSE SERPL-MCNC: 122 MG/DL — HIGH (ref 70–99)
HCT VFR BLD CALC: 42.7 % — SIGNIFICANT CHANGE UP (ref 39–50)
HGB BLD-MCNC: 14.1 G/DL — SIGNIFICANT CHANGE UP (ref 13–17)
IMM GRANULOCYTES NFR BLD AUTO: 0.2 % — SIGNIFICANT CHANGE UP (ref 0–0.9)
INR BLD: 1.08 — SIGNIFICANT CHANGE UP (ref 0.88–1.16)
LYMPHOCYTES # BLD AUTO: 2.15 K/UL — SIGNIFICANT CHANGE UP (ref 1–3.3)
LYMPHOCYTES # BLD AUTO: 44.1 % — HIGH (ref 13–44)
MAGNESIUM SERPL-MCNC: 2.3 MG/DL — SIGNIFICANT CHANGE UP (ref 1.6–2.6)
MCHC RBC-ENTMCNC: 30.5 PG — SIGNIFICANT CHANGE UP (ref 27–34)
MCHC RBC-ENTMCNC: 33 GM/DL — SIGNIFICANT CHANGE UP (ref 32–36)
MCV RBC AUTO: 92.2 FL — SIGNIFICANT CHANGE UP (ref 80–100)
MONOCYTES # BLD AUTO: 0.43 K/UL — SIGNIFICANT CHANGE UP (ref 0–0.9)
MONOCYTES NFR BLD AUTO: 8.8 % — SIGNIFICANT CHANGE UP (ref 2–14)
NEUTROPHILS # BLD AUTO: 2.14 K/UL — SIGNIFICANT CHANGE UP (ref 1.8–7.4)
NEUTROPHILS NFR BLD AUTO: 44 % — SIGNIFICANT CHANGE UP (ref 43–77)
NRBC # BLD: 0 /100 WBCS — SIGNIFICANT CHANGE UP (ref 0–0)
NT-PROBNP SERPL-SCNC: 9 PG/ML — SIGNIFICANT CHANGE UP (ref 0–300)
PLATELET # BLD AUTO: 255 K/UL — SIGNIFICANT CHANGE UP (ref 150–400)
POTASSIUM SERPL-MCNC: 3.7 MMOL/L — SIGNIFICANT CHANGE UP (ref 3.5–5.3)
POTASSIUM SERPL-SCNC: 3.7 MMOL/L — SIGNIFICANT CHANGE UP (ref 3.5–5.3)
PROT SERPL-MCNC: 7.6 G/DL — SIGNIFICANT CHANGE UP (ref 6–8.3)
PROTHROM AB SERPL-ACNC: 12.9 SEC — SIGNIFICANT CHANGE UP (ref 10.5–13.4)
RBC # BLD: 4.63 M/UL — SIGNIFICANT CHANGE UP (ref 4.2–5.8)
RBC # FLD: 12.3 % — SIGNIFICANT CHANGE UP (ref 10.3–14.5)
SARS-COV-2 RNA SPEC QL NAA+PROBE: NEGATIVE — SIGNIFICANT CHANGE UP
SODIUM SERPL-SCNC: 141 MMOL/L — SIGNIFICANT CHANGE UP (ref 135–145)
TROPONIN T SERPL-MCNC: 0.01 NG/ML — SIGNIFICANT CHANGE UP (ref 0–0.01)
TROPONIN T SERPL-MCNC: <0.01 NG/ML — SIGNIFICANT CHANGE UP (ref 0–0.01)
WBC # BLD: 4.87 K/UL — SIGNIFICANT CHANGE UP (ref 3.8–10.5)
WBC # FLD AUTO: 4.87 K/UL — SIGNIFICANT CHANGE UP (ref 3.8–10.5)

## 2023-01-15 PROCEDURE — 85730 THROMBOPLASTIN TIME PARTIAL: CPT

## 2023-01-15 PROCEDURE — 99285 EMERGENCY DEPT VISIT HI MDM: CPT

## 2023-01-15 PROCEDURE — 36415 COLL VENOUS BLD VENIPUNCTURE: CPT

## 2023-01-15 PROCEDURE — 83880 ASSAY OF NATRIURETIC PEPTIDE: CPT

## 2023-01-15 PROCEDURE — 71045 X-RAY EXAM CHEST 1 VIEW: CPT

## 2023-01-15 PROCEDURE — 85610 PROTHROMBIN TIME: CPT

## 2023-01-15 PROCEDURE — 93005 ELECTROCARDIOGRAM TRACING: CPT

## 2023-01-15 PROCEDURE — 80053 COMPREHEN METABOLIC PANEL: CPT

## 2023-01-15 PROCEDURE — 83735 ASSAY OF MAGNESIUM: CPT

## 2023-01-15 PROCEDURE — 71045 X-RAY EXAM CHEST 1 VIEW: CPT | Mod: 26

## 2023-01-15 PROCEDURE — 87635 SARS-COV-2 COVID-19 AMP PRB: CPT

## 2023-01-15 PROCEDURE — 99285 EMERGENCY DEPT VISIT HI MDM: CPT | Mod: 25

## 2023-01-15 PROCEDURE — 84484 ASSAY OF TROPONIN QUANT: CPT

## 2023-01-15 PROCEDURE — 85025 COMPLETE CBC W/AUTO DIFF WBC: CPT

## 2023-01-15 RX ORDER — ASPIRIN/CALCIUM CARB/MAGNESIUM 324 MG
324 TABLET ORAL ONCE
Refills: 0 | Status: COMPLETED | OUTPATIENT
Start: 2023-01-15 | End: 2023-01-15

## 2023-01-15 RX ADMIN — Medication 324 MILLIGRAM(S): at 10:04

## 2023-01-15 NOTE — ED PROVIDER NOTE - PATIENT PORTAL LINK FT
You can access the FollowMyHealth Patient Portal offered by [facility] by registering at the following website: [email or web address]. By joining [email or web address]’s FollowMyHealth portal, you will also be able to view your health information using other applications (apps) compatible with our system.

## 2023-01-15 NOTE — ED ADULT NURSE NOTE - NSIMPLEMENTINTERV_GEN_ALL_ED
Implemented All Universal Safety Interventions:  [place] to call system. Call bell, personal items and telephone within reach. Instruct patient to call for assistance. Room bathroom lighting operational. Non-slip footwear when patient is off stretcher. Physically safe environment: no spills, clutter or unnecessary equipment. Stretcher in lowest position, wheels locked, appropriate side rails in place.

## 2023-01-15 NOTE — ED PROVIDER NOTE - NSFOLLOWUPINSTRUCTIONS_ED_ALL_ED_FT
Can take tylenol 650mg every 6hrs as needed for pain.  Follow up with primary doctor within 1-2 days.  Follow up with cardiologist within 1-2 days. Can call [phone] ([facility]) to schedule appointment.   Return to ER for persistent fever/vomit, uncontrolled pain, worsening breathing, worsening lightheaded, focal weakness/numbness.    Nonspecific Chest Pain  Chest pain can be caused by many different conditions. It can be caused by a condition that is life-threatening and requires treatment right away. It can also be caused by something that is not life-threatening. If you have chest pain, it can be hard to know the difference, so it is important to get help right away to make sure that you do not have a serious condition.    Some life-threatening causes of chest pain include:  Heart attack.  A tear in the body's main blood vessel (aortic dissection).  Inflammation around your heart (pericarditis).  A problem in the lungs, such as a blood clot (pulmonary embolism) or a collapsed lung (pneumothorax).    Some non life-threatening causes of chest pain include:  Heartburn.  Anxiety or stress.  Damage to the bones, muscles, and cartilage that make up your chest wall.  Pneumonia or bronchitis.  Shingles infection (varicella-zoster virus).    Chest pain can feel like:  Pain or discomfort on the surface of your chest or deep in your chest.  Crushing, pressure, aching, or squeezing pain.  Burning or tingling.  Dull or sharp pain that is worse when you move, cough, or take a deep breath.  Pain or discomfort that is also felt in your back, neck, jaw, shoulder, or arm, or pain that spreads to any of these areas.  Your chest pain may come and go. It may also be constant. Your health care provider will do lab tests and other studies to find the cause of your pain. Treatment will depend on the cause of your chest pain.    Follow these instructions at home:  Pay attention to any changes in your symptoms. Tell your health care provider about them or any new symptoms. Follow these instructions from your health care provider.    Medicines   Take over-the-counter and prescription medicines only as told by your health care provider.  If you were prescribed an antibiotic, take it as told by your health care provider. Do not stop taking the antibiotic even if you start to feel better.    Lifestyle    Rest as directed by your health care provider.  Do not use any products that contain nicotine or tobacco, such as cigarettes and e-cigarettes. If you need help quitting, ask your health care provider.  Do not drink alcohol.  Make healthy lifestyle choices as recommended. These may include:  Getting regular exercise. Ask your health care provider to suggest some activities that are safe for you.  Eating a heart-healthy diet. This includes plenty of fresh fruits and vegetables, whole grains, low-fat (lean) protein, and low-fat dairy products. A dietitian can help you find healthy eating options.  Maintaining a healthy weight.  Managing any other health conditions you have, such as hypertension or diabetes.  Reducing stress, such as with yoga or relaxation techniques.    General instructions   Avoid any activities that bring on chest pain.  Keep all follow-up visits as told by your health care provider. This is important. This includes visits for any further testing if your chest pain does not go away.    Contact a health care provider if:  Your chest pain does not go away.  You feel depressed.  You have a fever.    Get help right away if:  Your chest pain gets worse.  You have a cough that gets worse, or you cough up blood.  You have severe pain in your abdomen.  You faint.  You have sudden, unexplained chest discomfort.  You have sudden, unexplained discomfort in your arms, back, neck, or jaw.  You have shortness of breath at any time.  You suddenly start to sweat, or your skin gets clammy.  You feel nausea or you vomit.  You suddenly feel lightheaded or dizzy.  You have severe weakness, or unexplained weakness or fatigue.  Your heart begins to beat quickly, or it feels like it is skipping beats.

## 2023-01-15 NOTE — ED PROVIDER NOTE - NSICDXPASTMEDICALHX_GEN_ALL_CORE_FT
PAST MEDICAL HISTORY:  Asthma, unspecified asthma severity, uncomplicated     Essential hypertension     Hyperlipidemia       BPH (benign prostatic hyperplasia)

## 2023-01-15 NOTE — ED PROVIDER NOTE - CLINICAL SUMMARY MEDICAL DECISION MAKING FREE TEXT BOX
L sided chest discomfort/palpitations, well appearing, NAD, VSS  Ddx: ACS, msk, other; not clinically appearing as PE/dissection, other GI/infectious pathology  - labs, ekg, cxr, reassess

## 2023-01-15 NOTE — ED PROVIDER NOTE - OBJECTIVE STATEMENT
63 y/o M, PMHx of HTN and BPH, now coming into ED for intermittent  L sided chest discomfort for 3 weeks. Pt reports pain worsens w/ lying down. Pt reports nonexertional sxs. He reports normal stress test 5-6 yrs ago. He also states having normal Holter Monitor test 2 yrs ago. Pt also has echo 1 yr ago which was normal. Pt states his sxs might be related to Tamsulosin use, so he stopped taking Tamsulosin. Pt denies SOB, leg swelling, cough, and pain. No radiation of pain. Pain is mild. Pt is ambulating w/o discomfort. Pt is a nonsmoker.

## 2023-01-15 NOTE — ED ADULT NURSE NOTE - OBJECTIVE STATEMENT
Left-sided chest pain with palpitations since starting flomax x 3 weeks ago, denies sob. Skin warm and dry, denies cardiac hx. Ambulatory, AAOX4, NAD.

## 2023-01-17 DIAGNOSIS — E78.5 HYPERLIPIDEMIA, UNSPECIFIED: ICD-10-CM

## 2023-01-17 DIAGNOSIS — R07.89 OTHER CHEST PAIN: ICD-10-CM

## 2023-01-17 DIAGNOSIS — Z20.822 CONTACT WITH AND (SUSPECTED) EXPOSURE TO COVID-19: ICD-10-CM

## 2023-01-17 DIAGNOSIS — R00.2 PALPITATIONS: ICD-10-CM

## 2023-01-17 DIAGNOSIS — I10 ESSENTIAL (PRIMARY) HYPERTENSION: ICD-10-CM

## 2023-01-17 DIAGNOSIS — J45.909 UNSPECIFIED ASTHMA, UNCOMPLICATED: ICD-10-CM

## 2023-01-17 DIAGNOSIS — N40.0 BENIGN PROSTATIC HYPERPLASIA WITHOUT LOWER URINARY TRACT SYMPTOMS: ICD-10-CM

## 2023-03-07 NOTE — ED ADULT NURSE NOTE - SUICIDE SCREENING QUESTION 2
No [Follow - Up] : a follow-up visit [DM Type 1] : DM Type 1 [Hypothyroidism] : hypothyroidism [Thyroid nodule/ MNG] : thyroid nodule/ MNG

## 2023-07-18 ENCOUNTER — EMERGENCY (EMERGENCY)
Facility: HOSPITAL | Age: 63
LOS: 1 days | Discharge: HOME | End: 2023-07-18
Attending: EMERGENCY MEDICINE | Admitting: EMERGENCY MEDICINE
Payer: COMMERCIAL

## 2023-07-18 VITALS
RESPIRATION RATE: 17 BRPM | SYSTOLIC BLOOD PRESSURE: 144 MMHG | OXYGEN SATURATION: 99 % | TEMPERATURE: 98 F | DIASTOLIC BLOOD PRESSURE: 80 MMHG | HEART RATE: 63 BPM

## 2023-07-18 VITALS
HEART RATE: 90 BPM | DIASTOLIC BLOOD PRESSURE: 84 MMHG | TEMPERATURE: 98 F | OXYGEN SATURATION: 97 % | WEIGHT: 160.06 LBS | HEIGHT: 67 IN | RESPIRATION RATE: 16 BRPM | SYSTOLIC BLOOD PRESSURE: 144 MMHG

## 2023-07-18 DIAGNOSIS — Z82.49 FAMILY HISTORY OF ISCHEMIC HEART DISEASE AND OTHER DISEASES OF THE CIRCULATORY SYSTEM: Chronic | ICD-10-CM

## 2023-07-18 PROBLEM — N40.0 BENIGN PROSTATIC HYPERPLASIA WITHOUT LOWER URINARY TRACT SYMPTOMS: Chronic | Status: ACTIVE | Noted: 2023-01-15

## 2023-07-18 LAB
ANION GAP SERPL CALC-SCNC: 11 MMOL/L — SIGNIFICANT CHANGE UP (ref 5–17)
APPEARANCE UR: CLEAR — SIGNIFICANT CHANGE UP
BACTERIA # UR AUTO: PRESENT /HPF
BASOPHILS # BLD AUTO: 0.03 K/UL — SIGNIFICANT CHANGE UP (ref 0–0.2)
BASOPHILS NFR BLD AUTO: 0.3 % — SIGNIFICANT CHANGE UP (ref 0–2)
BILIRUB UR-MCNC: NEGATIVE — SIGNIFICANT CHANGE UP
BUN SERPL-MCNC: 13 MG/DL — SIGNIFICANT CHANGE UP (ref 7–23)
CALCIUM SERPL-MCNC: 9.4 MG/DL — SIGNIFICANT CHANGE UP (ref 8.4–10.5)
CHLORIDE SERPL-SCNC: 105 MMOL/L — SIGNIFICANT CHANGE UP (ref 96–108)
CO2 SERPL-SCNC: 28 MMOL/L — SIGNIFICANT CHANGE UP (ref 22–31)
COLOR SPEC: YELLOW — SIGNIFICANT CHANGE UP
COMMENT - URINE: SIGNIFICANT CHANGE UP
CREAT SERPL-MCNC: 0.94 MG/DL — SIGNIFICANT CHANGE UP (ref 0.5–1.3)
DIFF PNL FLD: ABNORMAL
EGFR: 92 ML/MIN/1.73M2 — SIGNIFICANT CHANGE UP
EOSINOPHIL # BLD AUTO: 0.13 K/UL — SIGNIFICANT CHANGE UP (ref 0–0.5)
EOSINOPHIL NFR BLD AUTO: 1.2 % — SIGNIFICANT CHANGE UP (ref 0–6)
EPI CELLS # UR: SIGNIFICANT CHANGE UP /HPF (ref 0–5)
GLUCOSE SERPL-MCNC: 102 MG/DL — HIGH (ref 70–99)
GLUCOSE UR QL: NEGATIVE — SIGNIFICANT CHANGE UP
HCT VFR BLD CALC: 39 % — SIGNIFICANT CHANGE UP (ref 39–50)
HGB BLD-MCNC: 12.5 G/DL — LOW (ref 13–17)
IMM GRANULOCYTES NFR BLD AUTO: 0.3 % — SIGNIFICANT CHANGE UP (ref 0–0.9)
KETONES UR-MCNC: ABNORMAL MG/DL
LEUKOCYTE ESTERASE UR-ACNC: NEGATIVE — SIGNIFICANT CHANGE UP
LYMPHOCYTES # BLD AUTO: 1.16 K/UL — SIGNIFICANT CHANGE UP (ref 1–3.3)
LYMPHOCYTES # BLD AUTO: 11.1 % — LOW (ref 13–44)
MCHC RBC-ENTMCNC: 30.3 PG — SIGNIFICANT CHANGE UP (ref 27–34)
MCHC RBC-ENTMCNC: 32.1 GM/DL — SIGNIFICANT CHANGE UP (ref 32–36)
MCV RBC AUTO: 94.4 FL — SIGNIFICANT CHANGE UP (ref 80–100)
MONOCYTES # BLD AUTO: 0.72 K/UL — SIGNIFICANT CHANGE UP (ref 0–0.9)
MONOCYTES NFR BLD AUTO: 6.9 % — SIGNIFICANT CHANGE UP (ref 2–14)
NEUTROPHILS # BLD AUTO: 8.38 K/UL — HIGH (ref 1.8–7.4)
NEUTROPHILS NFR BLD AUTO: 80.2 % — HIGH (ref 43–77)
NITRITE UR-MCNC: NEGATIVE — SIGNIFICANT CHANGE UP
NRBC # BLD: 0 /100 WBCS — SIGNIFICANT CHANGE UP (ref 0–0)
PH UR: 6 — SIGNIFICANT CHANGE UP (ref 5–8)
PLATELET # BLD AUTO: 342 K/UL — SIGNIFICANT CHANGE UP (ref 150–400)
POTASSIUM SERPL-MCNC: 4 MMOL/L — SIGNIFICANT CHANGE UP (ref 3.5–5.3)
POTASSIUM SERPL-SCNC: 4 MMOL/L — SIGNIFICANT CHANGE UP (ref 3.5–5.3)
PROT UR-MCNC: NEGATIVE MG/DL — SIGNIFICANT CHANGE UP
RBC # BLD: 4.13 M/UL — LOW (ref 4.2–5.8)
RBC # FLD: 12.5 % — SIGNIFICANT CHANGE UP (ref 10.3–14.5)
RBC CASTS # UR COMP ASSIST: < 5 /HPF — SIGNIFICANT CHANGE UP
SODIUM SERPL-SCNC: 144 MMOL/L — SIGNIFICANT CHANGE UP (ref 135–145)
SP GR SPEC: >=1.03 — SIGNIFICANT CHANGE UP (ref 1–1.03)
UROBILINOGEN FLD QL: 0.2 E.U./DL — SIGNIFICANT CHANGE UP
WBC # BLD: 10.45 K/UL — SIGNIFICANT CHANGE UP (ref 3.8–10.5)
WBC # FLD AUTO: 10.45 K/UL — SIGNIFICANT CHANGE UP (ref 3.8–10.5)
WBC UR QL: < 5 /HPF — SIGNIFICANT CHANGE UP

## 2023-07-18 PROCEDURE — 85025 COMPLETE CBC W/AUTO DIFF WBC: CPT

## 2023-07-18 PROCEDURE — 87491 CHLMYD TRACH DNA AMP PROBE: CPT

## 2023-07-18 PROCEDURE — 76870 US EXAM SCROTUM: CPT

## 2023-07-18 PROCEDURE — 76870 US EXAM SCROTUM: CPT | Mod: 26

## 2023-07-18 PROCEDURE — 99284 EMERGENCY DEPT VISIT MOD MDM: CPT | Mod: 25

## 2023-07-18 PROCEDURE — 81001 URINALYSIS AUTO W/SCOPE: CPT

## 2023-07-18 PROCEDURE — 80048 BASIC METABOLIC PNL TOTAL CA: CPT

## 2023-07-18 PROCEDURE — 36415 COLL VENOUS BLD VENIPUNCTURE: CPT

## 2023-07-18 PROCEDURE — 99284 EMERGENCY DEPT VISIT MOD MDM: CPT

## 2023-07-18 PROCEDURE — 96374 THER/PROPH/DIAG INJ IV PUSH: CPT

## 2023-07-18 PROCEDURE — 87591 N.GONORRHOEAE DNA AMP PROB: CPT

## 2023-07-18 RX ORDER — LEVOFLOXACIN 5 MG/ML
1 INJECTION, SOLUTION INTRAVENOUS
Qty: 20 | Refills: 0
Start: 2023-07-18 | End: 2023-07-27

## 2023-07-18 RX ORDER — KETOROLAC TROMETHAMINE 30 MG/ML
15 SYRINGE (ML) INJECTION ONCE
Refills: 0 | Status: DISCONTINUED | OUTPATIENT
Start: 2023-07-18 | End: 2023-07-18

## 2023-07-18 RX ADMIN — Medication 15 MILLIGRAM(S): at 07:46

## 2023-07-18 NOTE — ED PROVIDER NOTE - NSFOLLOWUPINSTRUCTIONS_ED_ALL_ED_FT
Epididymitis  The male reproductive organ, showing the epididymis and the testicle.  Epididymitis is inflammation or swelling of the epididymis. This is caused by an infection. The epididymis is a cord-like structure that is located along the top and back part of the testicle. It collects and stores sperm from the testicle.    This condition can also cause pain and swelling of the testicle and scrotum. Symptoms usually start suddenly (acute epididymitis). Sometimes epididymitis starts gradually and lasts for a while (chronic epididymitis). Chronic epididymitis may be harder to treat.    What are the causes?  In men ages 20–40, this condition is usually caused by a bacterial infection or a sexually transmitted infection (STI), such as gonorrhea or chlamydia.    In men 40 and older, this condition is usually caused by bacteria from a urinary blockage or from abnormalities in the urinary system. These can result from:  Having a tube placed into the bladder (urinary catheter).  Having an enlarged or inflamed prostate gland.  Having recently had urinary tract surgery.  Having a problem with a backward flow of urine (retrograde).  In men who have a condition that weakens the body's defense system (immune system), such as human immunodeficiency virus (HIV), this condition can be caused by:  Other bacteria, including tuberculosis and syphilis.  Viruses.  Fungi.  Sometimes this condition occurs without infection. This may happen because of trauma or repetitive activities such as sports.    What increases the risk?  You are more likely to develop this condition if you have:  Unprotected sex with more than one partner.  Anal sex.  Had recent surgery.  A urinary catheter.  Urinary problems.  A suppressed immune system.  What are the signs or symptoms?  This condition usually begins suddenly with chills, fever, and pain behind the scrotum and in the testicle. Other symptoms include:  Swelling of the scrotum, testicle, or both.  Pain when ejaculating or urinating.  Pain in the back or abdomen.  Nausea.  Itching and discharge from the penis.  A frequent need to pass urine.  Redness, increased warmth, and tenderness of the scrotum.  How is this diagnosed?  Your health care provider can diagnose this condition based on your symptoms and medical history. Your health care provider will also do a physical exam to check your scrotum and testicle for swelling, pain, and redness. You may also have other tests, including:  Testing of discharge from the penis.  Testing your urine for infections, such as STIs.  Ultrasound to check for blood flow and inflammation.  Your health care provider may test you for other STIs, including HIV.    How is this treated?  Treatment for this condition depends on the cause. If your condition is caused by a bacterial infection, oral antibiotic medicine may be prescribed. If the bacterial infection has spread to your blood, you may need to receive IV antibiotics.    For both bacterial and nonbacterial epididymitis, you may be treated with:  Rest.  Elevation of the scrotum.  Pain medicines.  Anti-inflammatory medicines.  Surgery may be needed if:  You have pus buildup in the scrotum (abscess).  You have epididymitis that has not responded to other treatments.  Follow these instructions at home:  Medicines    Take over-the-counter and prescription medicines only as told by your health care provider.  If you were prescribed an antibiotic medicine, take it as told by your health care provider. Do not stop taking the antibiotic even if your condition improves.  Sexual activity    If your epididymitis was caused by an STI, avoid sexual activity until your treatment is complete.  Inform your sexual partner or partners if you test positive for an STI. They may need to be treated. Do not engage in sexual activity with your partner or partners until their treatment is completed.  Managing pain and swelling    A bathtub partially filled with water.  If directed, raise (elevate) your scrotum and apply ice. To do this:  Put ice in a plastic bag.  Place a small towel or pillow between your legs.  Rest your scrotum on the pillow or towel.  Place another towel between your skin and the plastic bag.  Leave the ice on for 20 minutes, 2–3 times a day.  Remove the ice if your skin turns bright red. This is very important. If you cannot feel pain, heat, or cold, you have a greater risk of damage to the area.  Keep your scrotum elevated and supported while resting. Ask your health care provider if you should wear a scrotal support, such as a jockstrap. Wear it as told by your health care provider.  Try taking a sitz bath to help with discomfort. This is a warm water bath that is taken while you are sitting down. The water should come up to your hips and should cover your buttocks. Do this 3–4 times per day or as told by your health care provider.  General instructions    Drink enough fluid to keep your urine pale yellow.  Return to your normal activities as told by your health care provider. Ask your health care provider what activities are safe for you.  Keep all follow-up visits. This is important.  Contact a health care provider if:  You have a fever.  Your pain medicine is not helping.  Your pain is getting worse.  Your symptoms do not improve within 3 days.  Summary  Epididymitis is inflammation or swelling of the epididymis. This is caused by an infection. This condition can also cause pain and swelling of the testicle and scrotum.  Treatment for this condition depends on the cause. If your condition is caused by a bacterial infection, oral antibiotic medicine may be prescribed.  Inform your sexual partner or partners if you test positive for an STI. They may need to be treated. Do not engage in sexual activity with your partner or partners until their treatment is completed.  Contact a health care provider if your symptoms do not improve within 3 days.  This information is not intended to replace advice given to you by your health care provider. Make sure you discuss any questions you have with your health care provider.

## 2023-07-18 NOTE — ED ADULT TRIAGE NOTE - BIRTH SEX
[FreeTextEntry1] : 40 year old male with h/o substance abuse on methadone with no recent breakthrough sz, no longer taking Depakote, requesting benzos. \par I have suggested to pt to start Lamictal  for preventive therapy, but has refused as he cannot get benzos to use daily.    \par Await EEG results\par complete Brain MRI/ BW that was previously ordered by Dr. Birmingham\par He is not driving 
Male

## 2023-07-18 NOTE — ED PROVIDER NOTE - OBJECTIVE STATEMENT
64 yo M with hx HTN, HLD, BPH presenting with 1 week of intermittent L sided testicular pain, tender to touch, with associated swelling and occasional precipitation and blood in urine.  Pain gradually began last Tuesday, lasted a few days and resolved.  Returned Sunday and has since been more painful.  No hx of kidney stones.  Denies dysuria or urethral discharge.

## 2023-07-18 NOTE — ED ADULT NURSE NOTE - OBJECTIVE STATEMENT
63 y.o. male complaining of right sided groin pain 7/10 that started a week ago intermittently and is now radiating up to the lower abdomen. Patient denies any recent trauma or injury, denies fever, chills, SOB, N/V/D; denies burning with urination. Patient is AAOx4, breathing spontaneously, even and unlabored, chest rise is visible and symmetrical.

## 2023-07-18 NOTE — ED PROVIDER NOTE - PATIENT PORTAL LINK FT
You can access the FollowMyHealth Patient Portal offered by [facility] by registering at the following website: [email or web address]. By joining [employer]’s FollowMyHealth portal, you will also be able to view your health information using other applications (apps) compatible with our system.

## 2023-07-18 NOTE — ED ADULT NURSE NOTE - LOCATION
RN discharge: Orders received for patient to dc home wityh pozo cath and will f/u with Dr Howard in 1 week. The patient v/u of dc instructions including med education, s/s to report to MD, wound care and need for f/u. He left the floor via wc to return home with family help. His MAGEN was dc'd per orders prior.   right groin

## 2023-07-18 NOTE — ED PROVIDER NOTE - GENITOURINARY, MLM
L testicle swollen, tender with enlarged mass like structure to upper outer pole.  No clear hernia in inguinal canal.  Normal penile shaft, no discharge, no rash.

## 2023-07-18 NOTE — ED ADULT NURSE NOTE - NSICDXPASTMEDICALHX_GEN_ALL_CORE_FT
PAST MEDICAL HISTORY:  Asthma, unspecified asthma severity, uncomplicated     BPH (benign prostatic hyperplasia)     Essential hypertension     Hyperlipidemia

## 2023-07-18 NOTE — ED PROVIDER NOTE - CLINICAL SUMMARY MEDICAL DECISION MAKING FREE TEXT BOX
64 yo M with intermittent L testicle pain x 1 week with associated precipitation and blood in urine.  VSS.  Pt looks well but has swollen testicle and ? epididymus.  DDx include UTI +/- epididymitis +/- orchitis vs. mass/ca vs hernia although less likely.  Doubt torsion, kidney stone or prostatitis given clinical picture. Plan labs, urine, u/s and give toradol for pain.

## 2023-07-18 NOTE — ED ADULT NURSE REASSESSMENT NOTE - NS ED NURSE REASSESS COMMENT FT1
Patient aaox4, states abdominal pain subsided and has left testicular tenderness to touch. Awaiting US results.

## 2023-07-18 NOTE — ED PROVIDER NOTE - CARE PROVIDER_API CALL
Sukhjinder De La Torre  Urology  [address]  [place] [address]  Phone: [phone]  Fax: [phone]  Follow Up Time: 4-6 Days

## 2023-07-18 NOTE — ED ADULT TRIAGE NOTE - CHIEF COMPLAINT QUOTE
abdominal pain/ testicular pain on/off for a week. pt denies trauma/ no fever ,no diarrhea /vomiting

## 2023-07-18 NOTE — ED ADULT NURSE NOTE - NSFALLUNIVINTERV_ED_ALL_ED
Bed/Stretcher in lowest position, wheels locked, appropriate side rails in place/Call bell, personal items and telephone in reach/Instruct patient to call for assistance before getting out of bed/chair/stretcher/Non-slip footwear applied when patient is off stretcher/[place] to call system/Physically safe environment - no spills, clutter or unnecessary equipment/Purposeful proactive rounding/Room/bathroom lighting operational, light cord in reach

## 2023-07-19 LAB
C TRACH RRNA SPEC QL NAA+PROBE: SIGNIFICANT CHANGE UP
N GONORRHOEA RRNA SPEC QL NAA+PROBE: SIGNIFICANT CHANGE UP
SPECIMEN SOURCE: SIGNIFICANT CHANGE UP

## 2023-07-20 DIAGNOSIS — E78.5 HYPERLIPIDEMIA, UNSPECIFIED: ICD-10-CM

## 2023-07-20 DIAGNOSIS — N45.1 EPIDIDYMITIS: ICD-10-CM

## 2023-07-20 DIAGNOSIS — N50.812 LEFT TESTICULAR PAIN: ICD-10-CM

## 2023-07-20 DIAGNOSIS — I10 ESSENTIAL (PRIMARY) HYPERTENSION: ICD-10-CM

## 2023-07-20 DIAGNOSIS — Z87.448 PERSONAL HISTORY OF OTHER DISEASES OF URINARY SYSTEM: ICD-10-CM

## 2023-12-13 NOTE — ED ADULT NURSE NOTE - CAS EDN DISCHARGE ASSESSMENT
Patient seen and examined secondary to rectal pressure with urge to push. Effective epidural in place.  VS  T(C): 36.7 (12-13-23 @ 00:38)  HR: 74 (12-13-23 @ 00:50)  BP: 124/68 (12-13-23 @ 00:50)  RR: 16 (12-13-23 @ 00:38)  SpO2: 98% (12-13-23 @ 00:48)
Alert and oriented to person, place and time

## 2023-12-19 NOTE — ED ADULT NURSE NOTE - NS ED NURSE RECORD ANOTHER VITAL SIGN
Per Keli,    \"Date: 12/19/2023  Dear DR. SAMANTHA PULIDO,  The below referenced case number is now pending medical director review.  Patient Name: THOM D IVET  Health Plan Name: [employer]  Member ID: [id number]  Case Number: [id number]\".   Yes

## 2024-10-17 ENCOUNTER — APPOINTMENT (OUTPATIENT)
Dept: UROLOGY | Facility: CLINIC | Age: 64
End: 2024-10-17
Payer: COMMERCIAL

## 2024-10-17 VITALS
SYSTOLIC BLOOD PRESSURE: 157 MMHG | DIASTOLIC BLOOD PRESSURE: 82 MMHG | TEMPERATURE: 98.3 F | OXYGEN SATURATION: 98 % | HEART RATE: 80 BPM

## 2024-10-17 DIAGNOSIS — R97.20 ELEVATED PROSTATE, SPECIFIC ANTIGEN [PSA]: ICD-10-CM

## 2024-10-17 PROCEDURE — 99203 OFFICE O/P NEW LOW 30 MIN: CPT

## 2024-10-28 ENCOUNTER — NON-APPOINTMENT (OUTPATIENT)
Age: 64
End: 2024-10-28

## 2024-10-29 ENCOUNTER — OUTPATIENT (OUTPATIENT)
Dept: OUTPATIENT SERVICES | Facility: HOSPITAL | Age: 64
LOS: 1 days | End: 2024-10-29

## 2024-10-29 ENCOUNTER — APPOINTMENT (OUTPATIENT)
Dept: MRI IMAGING | Facility: CLINIC | Age: 64
End: 2024-10-29
Payer: COMMERCIAL

## 2024-10-29 ENCOUNTER — RESULT REVIEW (OUTPATIENT)
Age: 64
End: 2024-10-29

## 2024-10-29 DIAGNOSIS — Z82.49 FAMILY HISTORY OF ISCHEMIC HEART DISEASE AND OTHER DISEASES OF THE CIRCULATORY SYSTEM: Chronic | ICD-10-CM

## 2024-10-29 PROCEDURE — 72197 MRI PELVIS W/O & W/DYE: CPT | Mod: 26

## 2024-10-29 PROCEDURE — 76498P: CUSTOM | Mod: 26

## 2024-10-30 ENCOUNTER — NON-APPOINTMENT (OUTPATIENT)
Age: 64
End: 2024-10-30

## 2024-11-05 ENCOUNTER — APPOINTMENT (OUTPATIENT)
Dept: UROLOGY | Facility: CLINIC | Age: 64
End: 2024-11-05
Payer: COMMERCIAL

## 2024-11-05 DIAGNOSIS — R35.0 FREQUENCY OF MICTURITION: ICD-10-CM

## 2024-11-05 DIAGNOSIS — N42.32 ATYPICAL SMALL ACINAR PROLIFERATION OF PROSTATE: ICD-10-CM

## 2024-11-05 PROCEDURE — 99215 OFFICE O/P EST HI 40 MIN: CPT

## 2024-11-07 LAB — BACTERIA UR CULT: NORMAL

## 2024-12-09 ENCOUNTER — OUTPATIENT (OUTPATIENT)
Dept: OUTPATIENT SERVICES | Facility: HOSPITAL | Age: 64
LOS: 1 days | End: 2024-12-09
Payer: COMMERCIAL

## 2024-12-09 DIAGNOSIS — R97.20 ELEVATED PROSTATE SPECIFIC ANTIGEN [PSA]: ICD-10-CM

## 2024-12-09 DIAGNOSIS — Z82.49 FAMILY HISTORY OF ISCHEMIC HEART DISEASE AND OTHER DISEASES OF THE CIRCULATORY SYSTEM: Chronic | ICD-10-CM

## 2024-12-09 PROCEDURE — C8001: CPT

## 2024-12-10 NOTE — ASU PATIENT PROFILE, ADULT - NS PREOP UNDERSTANDS INFO
No solid food or milk products after 12 mid-night 12/10/2024/ water no later than 7:15am DOS/ photo ID and insurance card/ comfortable clothing/ wife will take him home/yes

## 2024-12-10 NOTE — ASU PATIENT PROFILE, ADULT - HISTORY OF COVID-19 VACCINATION
Patient needs to make a yearly physical. She is here December 10- January 6th. If I can please get a date and time.     Thank you.   Yes

## 2024-12-11 ENCOUNTER — APPOINTMENT (OUTPATIENT)
Dept: UROLOGY | Facility: AMBULATORY SURGERY CENTER | Age: 64
End: 2024-12-11

## 2024-12-11 ENCOUNTER — TRANSCRIPTION ENCOUNTER (OUTPATIENT)
Age: 64
End: 2024-12-11

## 2024-12-11 ENCOUNTER — RESULT REVIEW (OUTPATIENT)
Age: 64
End: 2024-12-11

## 2024-12-11 ENCOUNTER — OUTPATIENT (OUTPATIENT)
Dept: OUTPATIENT SERVICES | Facility: HOSPITAL | Age: 64
LOS: 1 days | Discharge: HOME | End: 2024-12-11
Payer: COMMERCIAL

## 2024-12-11 VITALS
DIASTOLIC BLOOD PRESSURE: 80 MMHG | HEART RATE: 84 BPM | WEIGHT: 156.75 LBS | TEMPERATURE: 97 F | RESPIRATION RATE: 14 BRPM | OXYGEN SATURATION: 100 % | HEIGHT: 67 IN | SYSTOLIC BLOOD PRESSURE: 144 MMHG

## 2024-12-11 VITALS
HEART RATE: 65 BPM | OXYGEN SATURATION: 100 % | RESPIRATION RATE: 14 BRPM | SYSTOLIC BLOOD PRESSURE: 134 MMHG | DIASTOLIC BLOOD PRESSURE: 76 MMHG

## 2024-12-11 DIAGNOSIS — Z82.49 FAMILY HISTORY OF ISCHEMIC HEART DISEASE AND OTHER DISEASES OF THE CIRCULATORY SYSTEM: Chronic | ICD-10-CM

## 2024-12-11 DIAGNOSIS — Z98.890 OTHER SPECIFIED POSTPROCEDURAL STATES: Chronic | ICD-10-CM

## 2024-12-11 PROCEDURE — 76999F: CUSTOM | Mod: 26

## 2024-12-11 PROCEDURE — 55706 BX PRST8 NDL SAT SAMPLING: CPT

## 2024-12-11 PROCEDURE — G0416: CPT | Mod: 26

## 2024-12-11 RX ORDER — AMLODIPINE BESYLATE 10 MG/1
1 TABLET ORAL
Refills: 0 | DISCHARGE

## 2024-12-11 RX ORDER — 0.9 % SODIUM CHLORIDE 0.9 %
500 INTRAVENOUS SOLUTION INTRAVENOUS
Refills: 0 | Status: DISCONTINUED | OUTPATIENT
Start: 2024-12-11 | End: 2024-12-11

## 2024-12-11 RX ORDER — ONDANSETRON HYDROCHLORIDE 4 MG/1
4 TABLET, FILM COATED ORAL ONCE
Refills: 0 | Status: DISCONTINUED | OUTPATIENT
Start: 2024-12-11 | End: 2024-12-11

## 2024-12-11 RX ORDER — OXYCODONE HYDROCHLORIDE 30 MG/1
5 TABLET ORAL ONCE
Refills: 0 | Status: DISCONTINUED | OUTPATIENT
Start: 2024-12-11 | End: 2024-12-11

## 2024-12-11 RX ORDER — TAMSULOSIN HYDROCHLORIDE 0.4 MG/1
1 CAPSULE ORAL
Refills: 0 | DISCHARGE

## 2024-12-11 NOTE — ASU DISCHARGE PLAN (ADULT/PEDIATRIC) - ASU DC SPECIAL INSTRUCTIONSFT
Please refer to Dr. Bettencourt' instruction sheet. It will have everything you need to know about post-operative care.

## 2024-12-11 NOTE — ASU DISCHARGE PLAN (ADULT/PEDIATRIC) - FINANCIAL ASSISTANCE
[facility] provides services at a reduced cost to those who are determined to be eligible through [facility]’s financial assistance program. Information regarding [facility]’s financial assistance program can be found by going to [email or web address] or by calling [phone].

## 2024-12-11 NOTE — ASU DISCHARGE PLAN (ADULT/PEDIATRIC) - CARE PROVIDER_API CALL
Ade Bettencourt   Urology  [address] [place] [address]  Phone: [phone]  Fax: [phone]  Follow Up Time:    Manan Emanuel  Urology  [address] [address]  Phone: [phone]  Fax: [phone]  Follow Up Time:

## 2024-12-12 ENCOUNTER — NON-APPOINTMENT (OUTPATIENT)
Age: 64
End: 2024-12-12

## 2025-01-02 ENCOUNTER — NON-APPOINTMENT (OUTPATIENT)
Age: 65
End: 2025-01-02

## 2025-01-02 ENCOUNTER — APPOINTMENT (OUTPATIENT)
Dept: UROLOGY | Facility: CLINIC | Age: 65
End: 2025-01-02
Payer: COMMERCIAL

## 2025-01-02 DIAGNOSIS — C61 MALIGNANT NEOPLASM OF PROSTATE: ICD-10-CM

## 2025-01-02 PROCEDURE — 99214 OFFICE O/P EST MOD 30 MIN: CPT

## 2025-01-10 NOTE — ED ADULT NURSE NOTE - HIV OFFER
Patient called in stating that pharmacy had advised that the freestyle anne had been sent in and not the anne 3 which is what patient is needing. Patient is also requesting a script for a meter and strips.     Please advise.    Opt out

## 2025-01-13 ENCOUNTER — APPOINTMENT (OUTPATIENT)
Dept: UROLOGY | Facility: CLINIC | Age: 65
End: 2025-01-13

## 2025-01-13 ENCOUNTER — OUTPATIENT (OUTPATIENT)
Dept: OUTPATIENT SERVICES | Facility: HOSPITAL | Age: 65
LOS: 1 days | End: 2025-01-13
Payer: COMMERCIAL

## 2025-01-13 VITALS
BODY MASS INDEX: 24.17 KG/M2 | HEART RATE: 69 BPM | WEIGHT: 154 LBS | TEMPERATURE: 97.8 F | DIASTOLIC BLOOD PRESSURE: 88 MMHG | SYSTOLIC BLOOD PRESSURE: 158 MMHG | HEIGHT: 67 IN | OXYGEN SATURATION: 100 %

## 2025-01-13 DIAGNOSIS — Z98.890 OTHER SPECIFIED POSTPROCEDURAL STATES: Chronic | ICD-10-CM

## 2025-01-13 DIAGNOSIS — C61 MALIGNANT NEOPLASM OF PROSTATE: ICD-10-CM

## 2025-01-13 PROCEDURE — 71046 X-RAY EXAM CHEST 2 VIEWS: CPT

## 2025-01-13 PROCEDURE — 71046 X-RAY EXAM CHEST 2 VIEWS: CPT | Mod: 26

## 2025-01-13 PROCEDURE — 99213 OFFICE O/P EST LOW 20 MIN: CPT

## 2025-01-14 LAB
ALBUMIN SERPL ELPH-MCNC: 4.5 G/DL
ALP BLD-CCNC: 80 U/L
ALT SERPL-CCNC: 19 U/L
ANION GAP SERPL CALC-SCNC: 13 MMOL/L
APPEARANCE: CLEAR
APTT BLD: 37.6 SEC
AST SERPL-CCNC: 31 U/L
BACTERIA: NEGATIVE /HPF
BILIRUB SERPL-MCNC: 0.7 MG/DL
BILIRUBIN URINE: NEGATIVE
BLOOD URINE: ABNORMAL
BUN SERPL-MCNC: 9 MG/DL
CALCIUM SERPL-MCNC: 9.3 MG/DL
CAST: 0 /LPF
CHLORIDE SERPL-SCNC: 103 MMOL/L
CO2 SERPL-SCNC: 24 MMOL/L
COLOR: YELLOW
CREAT SERPL-MCNC: 0.92 MG/DL
EGFR: 93 ML/MIN/1.73M2
EPITHELIAL CELLS: 0 /HPF
GLUCOSE QUALITATIVE U: NEGATIVE MG/DL
GLUCOSE SERPL-MCNC: 93 MG/DL
HCT VFR BLD CALC: 40.7 %
HGB BLD-MCNC: 13.2 G/DL
INR PPP: 1.03 RATIO
KETONES URINE: ABNORMAL MG/DL
LEUKOCYTE ESTERASE URINE: NEGATIVE
MCHC RBC-ENTMCNC: 30.1 PG
MCHC RBC-ENTMCNC: 32.4 G/DL
MCV RBC AUTO: 92.9 FL
MICROSCOPIC-UA: NORMAL
NITRITE URINE: NEGATIVE
PH URINE: 6.5
PLATELET # BLD AUTO: 238 K/UL
POTASSIUM SERPL-SCNC: 3.8 MMOL/L
PROT SERPL-MCNC: 6.8 G/DL
PROTEIN URINE: NEGATIVE MG/DL
PT BLD: 12.2 SEC
RBC # BLD: 4.38 M/UL
RBC # FLD: 12.4 %
RED BLOOD CELLS URINE: 0 /HPF
SODIUM SERPL-SCNC: 141 MMOL/L
SPECIFIC GRAVITY URINE: 1.01
UROBILINOGEN URINE: 0.2 MG/DL
WBC # FLD AUTO: 5.15 K/UL
WHITE BLOOD CELLS URINE: 0 /HPF

## 2025-01-15 LAB — BACTERIA UR CULT: NORMAL

## 2025-01-21 VITALS
WEIGHT: 158.95 LBS | HEART RATE: 77 BPM | TEMPERATURE: 97 F | OXYGEN SATURATION: 100 % | DIASTOLIC BLOOD PRESSURE: 82 MMHG | HEIGHT: 67 IN | SYSTOLIC BLOOD PRESSURE: 146 MMHG | RESPIRATION RATE: 16 BRPM

## 2025-01-21 NOTE — PATIENT PROFILE ADULT - MEDICATIONS/VISITS
Patient interviewed and examined.       Chief Complaint   Patient presents with   • Cough   • Ear Pain     today, right ear red         HPI: Patient is a 6 year old male with history listed below presenting for cough right ear pain.  Patient has been coughing nonproductive for about 1 week.  Patient siblings have similar symptoms.  Today start having pain in the right ear.  No fevers today no shortness of breath no wheezing.  No nausea no vomiting patient is acting normally has been going to school without difficulty.      PAST MEDICAL HX:    No known problems                                             PAST SURGICAL HX:  There is no previous surgical history on file.    No family history on file.  Review of patient's family status indicates:  No family status on file      SH- lives at home with mom     Prior to Admission medications    Medication Sig Start Date End Date Taking? Authorizing Provider   amoxicillin (AMOXIL) 250 MG/5ML suspension Take 17.5 mLs by mouth 2 times daily for 7 days. 4/11/22 4/18/22  Jorge Baker DO        Vitals:    04/11/22 1710   Temp: 98.4 °F (36.9 °C)   Pulse: 83   Resp: 22   SpO2: 99%        No Known Allergies     The case was reviewed with the patient. Nursing notes reviewed. Vitals Reviewed.  Pertinent medical records readily available were reviewed    Review of Systems   Constitutional: Negative for chills and fever.   HENT: Positive for ear pain. Negative for congestion and sinus pain.    Respiratory: Positive for cough. Negative for shortness of breath.    Cardiovascular: Negative for chest pain and palpitations.   Gastrointestinal: Negative for abdominal pain, nausea and vomiting.          Physical Exam  Vitals and nursing note reviewed.   Constitutional:       General: He is active.   HENT:      Head: Normocephalic and atraumatic.      Right Ear: Ear canal and external ear normal. Tympanic membrane is erythematous.      Left Ear: Tympanic membrane normal.      Nose:  Congestion and rhinorrhea present.      Mouth/Throat:      Mouth: Mucous membranes are moist.      Pharynx: Posterior oropharyngeal erythema present. No oropharyngeal exudate.      Neck: Normal range of motion and neck supple.   Eyes:      Extraocular Movements: Extraocular movements intact.      Conjunctiva/sclera: Conjunctivae normal.   Cardiovascular:      Rate and Rhythm: Normal rate and regular rhythm.      Heart sounds: No murmur heard.  Pulmonary:      Effort: Pulmonary effort is normal. No respiratory distress.      Breath sounds: Normal breath sounds. No stridor. No wheezing, rhonchi or rales.   Musculoskeletal:         General: No swelling or tenderness. Normal range of motion.   Skin:     General: Skin is warm and dry.      Capillary Refill: Capillary refill takes less than 2 seconds.   Neurological:      General: No focal deficit present.      Mental Status: He is alert and oriented for age.      Motor: No weakness.          No results found for this visit on 04/11/22.       Based on my history, physical exam, and diagnostic evaluation, the patient appears to have symptoms consistent with acute otitis media. There is no mastoid tenderness or evidence of spreading infection. There was no trauma to the ear. Pt is tolerating oral food and fluid. Pt is to follow up and have repeat exam with their primary care physician in the next 48 hours and instructed to go to the emergency department for new complaints such as severe headache, increased ear pain, difficulty breathing or not tolerating oral food/fluid.     We will give amoxicillin     Urgent Care DEPARTMENT MEDICAL DECISION MAKING: Prior to obtaining the patient's history, performing the physical exam and reviewing the diagnostics, multiple initial diagnoses were considered based on the presenting problem.     DIAGNOSIS: After the evaluation in the [facility], my clinical impression is   ED Diagnosis        Final diagnosis    Right acute  otitis media     Associated Orders          AMOXICILLIN 250 MG/5ML PO SUSR            PLAN and FOLLOW-Up:  Pt or patient's guardian understands to go to ED if symptoms worsen.  In my judgment, in view of the above findings, the patient does not have a condition that requires surgical intervention or further testing in the emergency department at this time.     The following discussion took place: the patient and/or guardians was told that an appropriate evaluation has been performed, and in my medical judgment there is currently no evidence of an immediate life-threatening or surgical condition at this time. Discharge is therefore indicated at this time. The patient and/or guardian was advised that a small risk still exists that a serious condition could develop. The patient and/or guardian was instructed to arrange mandatory close follow-up with their physician (or with the referral physician given today) within 24 hours. If that doctor is not available, the patient and/or guardian was instructed to go to the Emergency Department. The patient and/or guardian was told to go to the Emergency Department immediately if the symptoms worsen, particularly if increased pain, vomiting, fevers, or any concerns at all.       Summary of your Discharge Medications          Accurate as of April 11, 2022  5:38 PM. Always use your most recent med list.            Take these Medications      Details   amoxicillin 250 MG/5ML suspension  Commonly known as: AMOXIL   Take 17.5 mLs by mouth 2 times daily for 7 days.             Patient and/or patient's guardian received written and verbal instructions regarding this condition.  Pt and/or guardian was provided instruction, education, and follow-up.  Follow up to be arranged within 2 days with pcp for further evaluation.      [This note used Dragon technology. Transcription errors are not uncommon and may not have been corrected prior to electronically signing the note. Should you find  these errors, please consult the clinician for interpretation (or apply common sense adjustment when safe and appropriate).]   no

## 2025-01-22 ENCOUNTER — RESULT REVIEW (OUTPATIENT)
Age: 65
End: 2025-01-22

## 2025-01-22 ENCOUNTER — APPOINTMENT (OUTPATIENT)
Dept: UROLOGY | Facility: HOSPITAL | Age: 65
End: 2025-01-22

## 2025-01-22 ENCOUNTER — INPATIENT (INPATIENT)
Facility: HOSPITAL | Age: 65
LOS: 0 days | Discharge: HOME | End: 2025-01-23
Attending: UROLOGY | Admitting: UROLOGY
Payer: COMMERCIAL

## 2025-01-22 ENCOUNTER — TRANSCRIPTION ENCOUNTER (OUTPATIENT)
Age: 65
End: 2025-01-22

## 2025-01-22 DIAGNOSIS — Z98.890 OTHER SPECIFIED POSTPROCEDURAL STATES: Chronic | ICD-10-CM

## 2025-01-22 LAB
BLD GP AB SCN SERPL QL: NEGATIVE — SIGNIFICANT CHANGE UP
RH IG SCN BLD-IMP: POSITIVE — SIGNIFICANT CHANGE UP

## 2025-01-22 PROCEDURE — 55866 LAPS SURG PRST8ECT RPBIC RAD: CPT | Mod: AS

## 2025-01-22 PROCEDURE — 88309 TISSUE EXAM BY PATHOLOGIST: CPT | Mod: 26

## 2025-01-22 PROCEDURE — 55866 LAPS SURG PRST8ECT RPBIC RAD: CPT

## 2025-01-22 DEVICE — LIGATING CLIPS WECK HEMOLOK POLYMER LARGE (PURPLE) 6: Type: IMPLANTABLE DEVICE | Status: FUNCTIONAL

## 2025-01-22 DEVICE — SURGICEL FIBRILLAR 4 X 4": Type: IMPLANTABLE DEVICE | Status: FUNCTIONAL

## 2025-01-22 RX ORDER — ENOXAPARIN SODIUM 100 MG/ML
40 INJECTION SUBCUTANEOUS ONCE
Refills: 0 | Status: COMPLETED | OUTPATIENT
Start: 2025-01-22 | End: 2025-01-22

## 2025-01-22 RX ORDER — LIDOCAINE HYDROCHLORIDE 10 MG/ML
2 INJECTION EPIDURAL; INFILTRATION; INTRACAUDAL THREE TIMES A DAY
Refills: 0 | Status: DISCONTINUED | OUTPATIENT
Start: 2025-01-22 | End: 2025-01-23

## 2025-01-22 RX ORDER — CEFAZOLIN SODIUM IN 0.9 % NACL 2 G/10 ML
1000 SYRINGE (ML) INTRAVENOUS EVERY 8 HOURS
Refills: 0 | Status: COMPLETED | OUTPATIENT
Start: 2025-01-22 | End: 2025-01-23

## 2025-01-22 RX ORDER — ACETAMINOPHEN 160 MG/5ML
1000 SUSPENSION ORAL EVERY 6 HOURS
Refills: 0 | Status: DISCONTINUED | OUTPATIENT
Start: 2025-01-22 | End: 2025-01-23

## 2025-01-22 RX ORDER — ACETAMINOPHEN 160 MG/5ML
1000 SUSPENSION ORAL ONCE
Refills: 0 | Status: COMPLETED | OUTPATIENT
Start: 2025-01-22 | End: 2025-01-22

## 2025-01-22 RX ORDER — AMLODIPINE BESYLATE 5 MG
5 TABLET ORAL DAILY
Refills: 0 | Status: DISCONTINUED | OUTPATIENT
Start: 2025-01-23 | End: 2025-01-23

## 2025-01-22 RX ORDER — CEFAZOLIN SODIUM IN 0.9 % NACL 2 G/10 ML
1000 SYRINGE (ML) INTRAVENOUS EVERY 8 HOURS
Refills: 0 | Status: DISCONTINUED | OUTPATIENT
Start: 2025-01-22 | End: 2025-01-22

## 2025-01-22 RX ORDER — HYDROMORPHONE HYDROCHLORIDE 4 MG/ML
0.5 INJECTION, SOLUTION INTRAMUSCULAR; INTRAVENOUS; SUBCUTANEOUS
Refills: 0 | Status: DISCONTINUED | OUTPATIENT
Start: 2025-01-22 | End: 2025-01-23

## 2025-01-22 RX ORDER — LIDOCAINE HYDROCHLORIDE 30 MG/G
1 CREAM TOPICAL EVERY 24 HOURS
Refills: 0 | Status: DISCONTINUED | OUTPATIENT
Start: 2025-01-22 | End: 2025-01-23

## 2025-01-22 RX ORDER — MEPERIDINE HCL 100 MG
12.5 TABLET ORAL ONCE
Refills: 0 | Status: DISCONTINUED | OUTPATIENT
Start: 2025-01-22 | End: 2025-01-22

## 2025-01-22 RX ORDER — ROSUVASTATIN CALCIUM 10 MG/1
10 TABLET, FILM COATED ORAL AT BEDTIME
Refills: 0 | Status: DISCONTINUED | OUTPATIENT
Start: 2025-01-22 | End: 2025-01-23

## 2025-01-22 RX ORDER — OXYBUTYNIN CHLORIDE 5 MG/1
5 TABLET, EXTENDED RELEASE ORAL EVERY 8 HOURS
Refills: 0 | Status: DISCONTINUED | OUTPATIENT
Start: 2025-01-22 | End: 2025-01-23

## 2025-01-22 RX ORDER — OXYCODONE HYDROCHLORIDE 30 MG/1
5 TABLET ORAL EVERY 6 HOURS
Refills: 0 | Status: DISCONTINUED | OUTPATIENT
Start: 2025-01-22 | End: 2025-01-23

## 2025-01-22 RX ORDER — ONDANSETRON 4 MG/1
4 TABLET, ORALLY DISINTEGRATING ORAL EVERY 6 HOURS
Refills: 0 | Status: DISCONTINUED | OUTPATIENT
Start: 2025-01-22 | End: 2025-01-23

## 2025-01-22 RX ORDER — HEPARIN SODIUM,PORCINE 10000/ML
5000 VIAL (ML) INJECTION EVERY 8 HOURS
Refills: 0 | Status: DISCONTINUED | OUTPATIENT
Start: 2025-01-22 | End: 2025-01-23

## 2025-01-22 RX ORDER — ROSUVASTATIN CALCIUM 10 MG/1
1 TABLET, FILM COATED ORAL
Refills: 0 | DISCHARGE

## 2025-01-22 RX ORDER — BACTERIOSTATIC SODIUM CHLORIDE 0.9 %
1000 VIAL (ML) INJECTION
Refills: 0 | Status: DISCONTINUED | OUTPATIENT
Start: 2025-01-22 | End: 2025-01-23

## 2025-01-22 RX ADMIN — Medication 100 MILLILITER(S): at 17:23

## 2025-01-22 RX ADMIN — OXYCODONE HYDROCHLORIDE 5 MILLIGRAM(S): 30 TABLET ORAL at 22:24

## 2025-01-22 RX ADMIN — ACETAMINOPHEN 1000 MILLIGRAM(S): 160 SUSPENSION ORAL at 09:29

## 2025-01-22 RX ADMIN — Medication 100 MILLIGRAM(S): at 23:04

## 2025-01-22 RX ADMIN — Medication 12.5 MILLIGRAM(S): at 15:10

## 2025-01-22 RX ADMIN — Medication 12.5 MILLIGRAM(S): at 15:25

## 2025-01-22 RX ADMIN — ENOXAPARIN SODIUM 40 MILLIGRAM(S): 100 INJECTION SUBCUTANEOUS at 09:29

## 2025-01-22 RX ADMIN — ACETAMINOPHEN 1000 MILLIGRAM(S): 160 SUSPENSION ORAL at 17:23

## 2025-01-22 RX ADMIN — ACETAMINOPHEN 1000 MILLIGRAM(S): 160 SUSPENSION ORAL at 23:04

## 2025-01-22 RX ADMIN — OXYCODONE HYDROCHLORIDE 5 MILLIGRAM(S): 30 TABLET ORAL at 21:24

## 2025-01-22 RX ADMIN — Medication 5000 UNIT(S): at 23:04

## 2025-01-22 NOTE — PROGRESS NOTE ADULT - SUBJECTIVE AND OBJECTIVE BOX
UROLOGY POST OP NOTE (PAGER # [phone])    PROCEDURE: radial prostatectomy     T(C): 36.4 (01-22-25 @ 15:05), Max: 36.4 (01-22-25 @ 15:05)  HR: 72 (01-22-25 @ 16:05) (67 - 77)  BP: 120/65 (01-22-25 @ 16:05) (120/65 - 146/82)  RR: 12 (01-22-25 @ 16:05) (10 - 20)  SpO2: 99% (01-22-25 @ 16:05) (95% - 100%)  Wt(kg): --    Subjective: pain endorses abdominal pain. PRN pain meds ordered. Denies SOB, CP, N, V.  ON PE: awake and alert    Abdomen: nondistended. Tenderness appropriate for sx. Incisions dry and intact    : no suprapubic/CVAT. FC intact draining light pink urine              A/P:

## 2025-01-22 NOTE — H&P ADULT - HISTORY OF PRESENT ILLNESS
CC: prostate cancer  ?  Biopsy results personally reviewed and independently interpreted  GG2 at right anterior TZ  ?  Moderate ED  "most of the time" can get erect  Less libido  ED has progressed with multiple biopsies in the past  Brother  of CAP  ?  ?  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  Surgical History  History of Prostate biopsy           ?  Family History  Family history of hypertension (V17.49) (Z82.49) : Sister           ?  Social History  Former smoker (V15.82) (Z87.891)           ?  Current Meds  amLODIPine Besylate 5 MG Oral Tablet  Crestor 5 MG Oral Tablet  hydroCHLOROthiazide 25 MG Oral Tablet  Multivitamins TABS           ?

## 2025-01-23 ENCOUNTER — NON-APPOINTMENT (OUTPATIENT)
Age: 65
End: 2025-01-23

## 2025-01-23 ENCOUNTER — TRANSCRIPTION ENCOUNTER (OUTPATIENT)
Age: 65
End: 2025-01-23

## 2025-01-23 VITALS
DIASTOLIC BLOOD PRESSURE: 80 MMHG | SYSTOLIC BLOOD PRESSURE: 148 MMHG | TEMPERATURE: 98 F | HEART RATE: 60 BPM | RESPIRATION RATE: 17 BRPM | OXYGEN SATURATION: 99 %

## 2025-01-23 DIAGNOSIS — C61 MALIGNANT NEOPLASM OF PROSTATE: ICD-10-CM

## 2025-01-23 LAB
ANION GAP SERPL CALC-SCNC: 12 MMOL/L — SIGNIFICANT CHANGE UP (ref 5–17)
BUN SERPL-MCNC: 8 MG/DL — SIGNIFICANT CHANGE UP (ref 7–23)
CALCIUM SERPL-MCNC: 9.1 MG/DL — SIGNIFICANT CHANGE UP (ref 8.4–10.5)
CHLORIDE SERPL-SCNC: 100 MMOL/L — SIGNIFICANT CHANGE UP (ref 96–108)
CO2 SERPL-SCNC: 25 MMOL/L — SIGNIFICANT CHANGE UP (ref 22–31)
CREAT SERPL-MCNC: 0.85 MG/DL — SIGNIFICANT CHANGE UP (ref 0.5–1.3)
EGFR: 98 ML/MIN/1.73M2 — SIGNIFICANT CHANGE UP
GLUCOSE SERPL-MCNC: 122 MG/DL — HIGH (ref 70–99)
HCT VFR BLD CALC: 40.1 % — SIGNIFICANT CHANGE UP (ref 39–50)
HGB BLD-MCNC: 13.1 G/DL — SIGNIFICANT CHANGE UP (ref 13–17)
MAGNESIUM SERPL-MCNC: 2.1 MG/DL — SIGNIFICANT CHANGE UP (ref 1.6–2.6)
MCHC RBC-ENTMCNC: 30.5 PG — SIGNIFICANT CHANGE UP (ref 27–34)
MCHC RBC-ENTMCNC: 32.7 G/DL — SIGNIFICANT CHANGE UP (ref 32–36)
MCV RBC AUTO: 93.3 FL — SIGNIFICANT CHANGE UP (ref 80–100)
NRBC # BLD: 0 /100 WBCS — SIGNIFICANT CHANGE UP (ref 0–0)
NRBC BLD-RTO: 0 /100 WBCS — SIGNIFICANT CHANGE UP (ref 0–0)
PHOSPHATE SERPL-MCNC: 3.4 MG/DL — SIGNIFICANT CHANGE UP (ref 2.5–4.5)
PLATELET # BLD AUTO: 206 K/UL — SIGNIFICANT CHANGE UP (ref 150–400)
POTASSIUM SERPL-MCNC: 3.7 MMOL/L — SIGNIFICANT CHANGE UP (ref 3.5–5.3)
POTASSIUM SERPL-SCNC: 3.7 MMOL/L — SIGNIFICANT CHANGE UP (ref 3.5–5.3)
RBC # BLD: 4.3 M/UL — SIGNIFICANT CHANGE UP (ref 4.2–5.8)
RBC # FLD: 12.2 % — SIGNIFICANT CHANGE UP (ref 10.3–14.5)
SODIUM SERPL-SCNC: 137 MMOL/L — SIGNIFICANT CHANGE UP (ref 135–145)
WBC # BLD: 9.94 K/UL — SIGNIFICANT CHANGE UP (ref 3.8–10.5)
WBC # FLD AUTO: 9.94 K/UL — SIGNIFICANT CHANGE UP (ref 3.8–10.5)

## 2025-01-23 PROCEDURE — 86850 RBC ANTIBODY SCREEN: CPT

## 2025-01-23 PROCEDURE — 80048 BASIC METABOLIC PNL TOTAL CA: CPT

## 2025-01-23 PROCEDURE — 36415 COLL VENOUS BLD VENIPUNCTURE: CPT

## 2025-01-23 PROCEDURE — 88309 TISSUE EXAM BY PATHOLOGIST: CPT

## 2025-01-23 PROCEDURE — 83735 ASSAY OF MAGNESIUM: CPT

## 2025-01-23 PROCEDURE — 86900 BLOOD TYPING SEROLOGIC ABO: CPT

## 2025-01-23 PROCEDURE — S2900: CPT

## 2025-01-23 PROCEDURE — C9399: CPT

## 2025-01-23 PROCEDURE — C1889: CPT

## 2025-01-23 PROCEDURE — 86901 BLOOD TYPING SEROLOGIC RH(D): CPT

## 2025-01-23 PROCEDURE — 84100 ASSAY OF PHOSPHORUS: CPT

## 2025-01-23 PROCEDURE — 85027 COMPLETE CBC AUTOMATED: CPT

## 2025-01-23 RX ORDER — AMOXICILLIN AND CLAVULANATE POTASSIUM 200; 28.5 MG/5ML; MG/5ML
1 POWDER, FOR SUSPENSION ORAL
Qty: 6 | Refills: 0
Start: 2025-01-23 | End: 2025-01-25

## 2025-01-23 RX ADMIN — Medication 100 MILLIGRAM(S): at 05:40

## 2025-01-23 RX ADMIN — Medication 5 MILLIGRAM(S): at 05:40

## 2025-01-23 RX ADMIN — ACETAMINOPHEN 1000 MILLIGRAM(S): 160 SUSPENSION ORAL at 12:38

## 2025-01-23 RX ADMIN — Medication 5000 UNIT(S): at 05:39

## 2025-01-23 RX ADMIN — ACETAMINOPHEN 1000 MILLIGRAM(S): 160 SUSPENSION ORAL at 05:40

## 2025-01-23 NOTE — DISCHARGE NOTE NURSING/CASE MANAGEMENT/SOCIAL WORK - PATIENT PORTAL LINK FT
You can access the FollowMyHealth Patient Portal offered by [facility] by registering at the following website: [email or web address]. By joining [facility]’s FollowMyHealth portal, you will also be able to view your health information using other applications (apps) compatible with our system.

## 2025-01-23 NOTE — DISCHARGE NOTE NURSING/CASE MANAGEMENT/SOCIAL WORK - NSDCPEFALRISK_GEN_ALL_CORE
For information on Fall & Injury Prevention, visit: [email or web address] OR  [email or web address] OR  https://www.cdc.gov/steadi/patient.html

## 2025-01-23 NOTE — DISCHARGE NOTE NURSING/CASE MANAGEMENT/SOCIAL WORK - FINANCIAL ASSISTANCE
[facility] provides services at a reduced cost to those who are determined to be eligible through [facility]’s financial assistance program. Information regarding [facility]’s financial assistance program can be found by going to [email or web address] or by calling [phone].

## 2025-01-23 NOTE — DISCHARGE NOTE PROVIDER - NSDCMRMEDTOKEN_GEN_ALL_CORE_FT
amLODIPine 5 mg oral tablet: 1 tab(s) orally once a day  amoxicillin-clavulanate 875 mg-125 mg oral tablet: 1 tab(s) orally 2 times a day  aspirin 81 mg oral tablet: orally once a day  Crestor 10 mg oral tablet: 1 tab(s) orally once a day  tamsulosin 0.4 mg oral capsule: 1 cap(s) orally once a day (at bedtime)

## 2025-01-23 NOTE — DISCHARGE NOTE NURSING/CASE MANAGEMENT/SOCIAL WORK - NSDCVIVACCINE_GEN_ALL_CORE_FT
influenza, injectable, quadrivalent, preservative free; 13-Sep-2017 16:19; Fay Pope (RN); [employer]; [id number]; IntraMuscular; Deltoid Right.; 0.5 milliLiter(s); VIS (VIS Published: 07-Aug-2015, VIS Presented: 13-Sep-2017);

## 2025-01-23 NOTE — PROGRESS NOTE ADULT - ASSESSMENT
64M hx HLD HTN s/p radical prostatectomy 1/22.    Plan:  -monitor I's and O's  -SCD's  -OOB  -IS  -pain and nausea control  -ancef  -CLD  -f/u am labs
64M hx HLD HTN s/p radical prostatectomy 1/22.

## 2025-01-23 NOTE — DISCHARGE NOTE PROVIDER - NSDCFUADDINST_GEN_ALL_CORE_FT
[facility] Catheter Instructions:  - Please care for and empty urinary catheter bag as instructed by nurse.    General Discharge Instructions:  Use Tylenol for pain control as needed  Please resume all regular home medications unless specifically advised not to take a particular medication. Also, please take any new medications as prescribed.  Please get plenty of rest, continue to ambulate several times per day, and drink adequate amounts of fluids.     Warning Signs:  Please call your doctor if you experience the following:  *You experience new chest pain, pressure, squeezing or tightness.  *New or worsening cough, shortness of breath, or wheeze.  *If you are vomiting and cannot keep down fluids or your medications.  *You are getting dehydrated due to continued vomiting, diarrhea, or other reasons. Signs of dehydration include dry mouth, rapid heartbeat, or feeling dizzy or faint when standing.  *You see blood or dark/black material when you vomit or have a bowel movement.  *You experience burning when you urinate, have blood in your urine, or experience a discharge.  *Your pain is not improving within 8-12 hours or is not gone within 24 hours. Call or return immediately if your pain is getting worse, changes location, or moves to your chest or back.  *You have shaking chills, or fever greater than 100.4 degrees Fahrenheit.  *Any change in your symptoms, or any new symptoms that concern you.

## 2025-01-23 NOTE — DISCHARGE NOTE PROVIDER - CARE PROVIDER_API CALL
Manan Emanuel  Urology  [address] [address]  Phone: [phone]  Fax: [phone]  Follow Up Time:

## 2025-01-23 NOTE — DISCHARGE NOTE PROVIDER - NSDCFUSCHEDAPPT_GEN_ALL_CORE_FT
Manan Emanuel  [facility] Physician [facility]  UROLOGY [address]  Scheduled Appointment: 01/30/2025

## 2025-01-23 NOTE — DISCHARGE NOTE PROVIDER - HOSPITAL COURSE
64 year old male with history of CAP s/p RALP 01/22. Surgical course without complications. Hospitalization course without complications. Patient is afebrile, hemodynamically stable and optimized for discharge home.

## 2025-01-23 NOTE — DISCHARGE NOTE PROVIDER - NSDCCPCAREPLAN_GEN_ALL_CORE_FT
PRINCIPAL DISCHARGE DIAGNOSIS  Diagnosis: Prostate cancer  Assessment and Plan of Treatment: Follow up with Dr Emanuel for trial of void appointment. Start antibiotics (Augmentin 875-125 mg BID) 1 day prior to catheter removal in office      SECONDARY DISCHARGE DIAGNOSES  Diagnosis: Hypertension  Assessment and Plan of Treatment: Resume home medication regimen. Follow up with PCP/Specialist

## 2025-01-23 NOTE — DISCHARGE NOTE PROVIDER - NSDCQMPCI_CARD_ALL_CORE
No
aerobic capacity/endurance/poor safety awareness/cognitive impairment/gait, locomotion, and balance/posture/muscle strength

## 2025-01-23 NOTE — PROGRESS NOTE ADULT - SUBJECTIVE AND OBJECTIVE BOX
DORINA PALOMINO  [id number]  01-23-25 @ 05:36      UROLOGY SERVICE: DAILY PROGRESS NOTE    Chief admitting complaint: Prostate cancer    No acute events overnight.  No N/V/D/F.  Pain moderately controlled.      LABS: Pending        I/O:  I&O's Summary    22 Jan 2025 07:01  -  23 Jan 2025 05:36  --------------------------------------------------------  IN: 0 mL / OUT: 2300 mL / NET: -2300 mL        VITALS:  Vital Signs Last 24 Hrs  T(C): 36.7 (01-23-25 @ 05:08), Max: 36.9 (01-22-25 @ 18:02)  T(F): 98.1 (01-23-25 @ 05:08), Max: 98.5 (01-22-25 @ 20:35)  HR: 59 (01-23-25 @ 05:08) (59 - 77)  BP: 136/73 (01-23-25 @ 05:08) (116/65 - 146/82)  BP(mean): 92 (01-22-25 @ 20:35) (84 - 97)  RR: 18 (01-23-25 @ 05:08) (12 - 20)  SpO2: 98% (01-23-25 @ 05:08) (95% - 100%)      PHYSICAL EXAM:    GEN: NAD, AAOx3  ABD: soft, NT/ND, no guarding or rigidity  : bueno in place  EXT: b/l LE with SCDS on  SKIN: No rashes, lesions, ulcerations DORINA PALOMINO  [id number]  01-23-25 @ 05:36      UROLOGY SERVICE: DAILY PROGRESS NOTE    Chief admitting complaint: Prostate cancer    No acute events overnight.  No N/V/D/F.  Pain moderately controlled.      LABS: Pending        I/O:  I&O's Summary    22 Jan 2025 07:01  -  23 Jan 2025 05:36  --------------------------------------------------------  IN: 0 mL / OUT: 2300 mL / NET: -2300 mL        VITALS:  Vital Signs Last 24 Hrs  T(C): 36.7 (01-23-25 @ 05:08), Max: 36.9 (01-22-25 @ 18:02)  T(F): 98.1 (01-23-25 @ 05:08), Max: 98.5 (01-22-25 @ 20:35)  HR: 59 (01-23-25 @ 05:08) (59 - 77)  BP: 136/73 (01-23-25 @ 05:08) (116/65 - 146/82)  BP(mean): 92 (01-22-25 @ 20:35) (84 - 97)  RR: 18 (01-23-25 @ 05:08) (12 - 20)  SpO2: 98% (01-23-25 @ 05:08) (95% - 100%)      PHYSICAL EXAM:    GEN: NAD, AAOx3  ABD: soft, NT/ND, no guarding or rigidity, incision c/d/i  : bueno in place  EXT: b/l LE with SCDS on  SKIN: No rashes, lesions, ulcerations

## 2025-01-26 DIAGNOSIS — E78.5 HYPERLIPIDEMIA, UNSPECIFIED: ICD-10-CM

## 2025-01-26 DIAGNOSIS — Z79.82 LONG TERM (CURRENT) USE OF ASPIRIN: ICD-10-CM

## 2025-01-26 DIAGNOSIS — Z79.899 OTHER LONG TERM (CURRENT) DRUG THERAPY: ICD-10-CM

## 2025-01-26 DIAGNOSIS — I10 ESSENTIAL (PRIMARY) HYPERTENSION: ICD-10-CM

## 2025-01-26 DIAGNOSIS — Z87.891 PERSONAL HISTORY OF NICOTINE DEPENDENCE: ICD-10-CM

## 2025-01-26 DIAGNOSIS — C61 MALIGNANT NEOPLASM OF PROSTATE: ICD-10-CM

## 2025-01-26 DIAGNOSIS — N42.32 ATYPICAL SMALL ACINAR PROLIFERATION OF PROSTATE: ICD-10-CM

## 2025-01-26 DIAGNOSIS — R35.0 FREQUENCY OF MICTURITION: ICD-10-CM

## 2025-01-26 DIAGNOSIS — R00.2 PALPITATIONS: ICD-10-CM

## 2025-01-30 ENCOUNTER — APPOINTMENT (OUTPATIENT)
Dept: UROLOGY | Facility: CLINIC | Age: 65
End: 2025-01-30
Payer: COMMERCIAL

## 2025-01-30 VITALS
OXYGEN SATURATION: 100 % | SYSTOLIC BLOOD PRESSURE: 125 MMHG | TEMPERATURE: 98.3 F | HEART RATE: 70 BPM | DIASTOLIC BLOOD PRESSURE: 75 MMHG

## 2025-01-30 PROBLEM — C61 MALIGNANT NEOPLASM OF PROSTATE: Chronic | Status: ACTIVE | Noted: 2025-01-21

## 2025-01-30 PROBLEM — J30.2 OTHER SEASONAL ALLERGIC RHINITIS: Chronic | Status: ACTIVE | Noted: 2025-01-21

## 2025-01-30 PROCEDURE — 99024 POSTOP FOLLOW-UP VISIT: CPT

## 2025-01-30 RX ORDER — TADALAFIL 5 MG/1
5 TABLET ORAL
Qty: 90 | Refills: 1 | Status: ACTIVE | COMMUNITY
Start: 2025-01-30 | End: 1900-01-01

## 2025-02-11 ENCOUNTER — EMERGENCY (EMERGENCY)
Facility: HOSPITAL | Age: 65
LOS: 1 days | Discharge: HOME | End: 2025-02-11
Attending: STUDENT IN AN ORGANIZED HEALTH CARE EDUCATION/TRAINING PROGRAM | Admitting: STUDENT IN AN ORGANIZED HEALTH CARE EDUCATION/TRAINING PROGRAM
Payer: COMMERCIAL

## 2025-02-11 VITALS
WEIGHT: 149.91 LBS | HEIGHT: 67 IN | DIASTOLIC BLOOD PRESSURE: 76 MMHG | RESPIRATION RATE: 20 BRPM | SYSTOLIC BLOOD PRESSURE: 146 MMHG | TEMPERATURE: 98 F | HEART RATE: 98 BPM | OXYGEN SATURATION: 97 %

## 2025-02-11 DIAGNOSIS — Z98.890 OTHER SPECIFIED POSTPROCEDURAL STATES: Chronic | ICD-10-CM

## 2025-02-11 PROCEDURE — 99283 EMERGENCY DEPT VISIT LOW MDM: CPT | Mod: 25

## 2025-02-11 PROCEDURE — 96372 THER/PROPH/DIAG INJ SC/IM: CPT

## 2025-02-11 PROCEDURE — 99284 EMERGENCY DEPT VISIT MOD MDM: CPT

## 2025-02-11 RX ORDER — KETOROLAC TROMETHAMINE 10 MG
30 TABLET ORAL ONCE
Refills: 0 | Status: DISCONTINUED | OUTPATIENT
Start: 2025-02-11 | End: 2025-02-11

## 2025-02-11 RX ORDER — LIDOCAINE HYDROCHLORIDE 30 MG/G
1 CREAM TOPICAL ONCE
Refills: 0 | Status: COMPLETED | OUTPATIENT
Start: 2025-02-11 | End: 2025-02-11

## 2025-02-11 RX ADMIN — Medication 10 MILLIGRAM(S): at 20:52

## 2025-02-11 RX ADMIN — Medication 30 MILLIGRAM(S): at 20:52

## 2025-02-11 RX ADMIN — LIDOCAINE HYDROCHLORIDE 1 PATCH: 30 CREAM TOPICAL at 20:52

## 2025-02-11 NOTE — ED PROVIDER NOTE - PHYSICAL EXAMINATION
general: Well appearing, in no acute distress  HEENT: Normocephalic, atraumatic, extraocular movements intact  CV: Regular rate  Pulm: No respiratory distress, no tachypnea  Abd: Flat, no gross distension  Ext: warm and well perfused, 2+ pulses, no calf ttp  Skin: No gross rashes or lesions  Neuro: Alert and oriented, moving all extremities, CN II-XII intact, motor and sensation intact bialterally, + SLR on LLE,

## 2025-02-11 NOTE — ED PROVIDER NOTE - OBJECTIVE STATEMENT
63 yo with ho prostate cancer s/p removal presenting with LBP X 4 days, intermittent, radiating down L leg. No numbness, weakness, tingling. No urinary or fecal changes. No trauma. No fever, chills, abd pain, flank pain, urinary complaints. ROS as above.

## 2025-02-11 NOTE — ED ADULT NURSE NOTE - NSFALLUNIVINTERV_ED_ALL_ED
Bed/Stretcher in lowest position, wheels locked, appropriate side rails in place/Call bell, personal items and telephone in reach/Instruct patient to call for assistance before getting out of bed/chair/stretcher/Non-slip footwear applied when patient is off stretcher/[place] to call system/Physically safe environment - no spills, clutter or unnecessary equipment/Purposeful proactive rounding/Room/bathroom lighting operational, light cord in reach

## 2025-02-11 NOTE — ED PROVIDER NOTE - NS ED ROS FT
Constitutional: No fever or chills  Eyes: No discharge or drainage  Ears, Nose, Mouth, Throat: No nasal discharge, no sore throat  Cardiovascular: No chest pain, no palpitations  Respiratory: No shortness of breath, no cough  Gastrointestinal: No nausea or vomiting, no abdominal pain, no diarrhea or constipation  Musculoskeletal: +joint pain, no swelling  Skin: No rashes or lesions  Neurological: No numbness, weakness, tingling, no headache  Psychiatric: No depression

## 2025-02-11 NOTE — ED PROVIDER NOTE - PATIENT PORTAL LINK FT
You can access the FollowMyHealth Patient Portal offered by [facility] by registering at the following website: [email or web address]. By joining [facility]’s FollowMyHealth portal, you will also be able to view your health information using other applications (apps) compatible with our system.

## 2025-02-11 NOTE — ED ADULT NURSE NOTE - OBJECTIVE STATEMENT
Detail Level: Detailed
63 yo male c/o LBP and bilateral thigh pain x4 days. Ambulatory with steady gait.
Detail Level: Zone

## 2025-02-11 NOTE — ED PROVIDER NOTE - CLINICAL SUMMARY MEDICAL DECISION MAKING FREE TEXT BOX
63 yo with back pain, c/w sciatica, no red flags, neuro intact, will treat symptoms, given return precautions, advised to get nonemergent mri.

## 2025-02-11 NOTE — ED PROVIDER NOTE - NSFOLLOWUPINSTRUCTIONS_ED_ALL_ED_FT
Please take tylenol or motrin as needed. Apply lidocaine patches to the area as needed. Return for worsening symptoms, fever, chills, worsening pain, numbness, weakness, tingling, difficulty urinating. You should follow up with your primary physician who may order an outpatient MRI.

## 2025-02-11 NOTE — ED ADULT TRIAGE NOTE - HEIGHT IN INCHES
Head,  normocephalic,  atraumatic,  Face,  Face within normal limits,  Ears,  External ears within normal limits,  Nose/Nasopharynx,  External nose  normal appearance Lips,  Appearance normal
7

## 2025-02-14 DIAGNOSIS — M54.50 LOW BACK PAIN, UNSPECIFIED: ICD-10-CM

## 2025-03-05 ENCOUNTER — RX CHANGE (OUTPATIENT)
Age: 65
End: 2025-03-05

## 2025-03-10 ENCOUNTER — APPOINTMENT (OUTPATIENT)
Dept: UROLOGY | Facility: CLINIC | Age: 65
End: 2025-03-10
Payer: COMMERCIAL

## 2025-03-10 VITALS
SYSTOLIC BLOOD PRESSURE: 123 MMHG | DIASTOLIC BLOOD PRESSURE: 78 MMHG | HEIGHT: 67 IN | WEIGHT: 154 LBS | HEART RATE: 71 BPM | TEMPERATURE: 98 F | BODY MASS INDEX: 24.17 KG/M2 | OXYGEN SATURATION: 98 %

## 2025-03-10 DIAGNOSIS — C61 MALIGNANT NEOPLASM OF PROSTATE: ICD-10-CM

## 2025-03-10 PROCEDURE — 99024 POSTOP FOLLOW-UP VISIT: CPT

## 2025-03-10 RX ORDER — CEPHALEXIN 500 MG/1
500 CAPSULE ORAL 3 TIMES DAILY
Qty: 15 | Refills: 0 | Status: ACTIVE | COMMUNITY
Start: 2025-03-10 | End: 1900-01-01

## 2025-03-11 LAB — PSA, POST - PROSTATECTOMY: <0.01 NG/ML

## 2025-03-27 ENCOUNTER — APPOINTMENT (OUTPATIENT)
Dept: UROLOGY | Facility: CLINIC | Age: 65
End: 2025-03-27
Payer: COMMERCIAL

## 2025-03-27 VITALS
WEIGHT: 154 LBS | DIASTOLIC BLOOD PRESSURE: 68 MMHG | OXYGEN SATURATION: 100 % | SYSTOLIC BLOOD PRESSURE: 132 MMHG | HEART RATE: 62 BPM | BODY MASS INDEX: 24.17 KG/M2 | TEMPERATURE: 98 F | HEIGHT: 67 IN

## 2025-03-27 PROCEDURE — 99024 POSTOP FOLLOW-UP VISIT: CPT

## 2025-05-08 ENCOUNTER — APPOINTMENT (OUTPATIENT)
Dept: UROLOGY | Facility: CLINIC | Age: 65
End: 2025-05-08
Payer: COMMERCIAL

## 2025-05-08 ENCOUNTER — NON-APPOINTMENT (OUTPATIENT)
Age: 65
End: 2025-05-08

## 2025-05-08 VITALS
DIASTOLIC BLOOD PRESSURE: 81 MMHG | SYSTOLIC BLOOD PRESSURE: 135 MMHG | WEIGHT: 154 LBS | HEIGHT: 67 IN | TEMPERATURE: 97.8 F | BODY MASS INDEX: 24.17 KG/M2 | HEART RATE: 99 BPM

## 2025-05-08 DIAGNOSIS — N52.31 ERECTILE DYSFUNCTION FOLLOWING RADICAL PROSTATECTOMY: ICD-10-CM

## 2025-05-08 DIAGNOSIS — C61 MALIGNANT NEOPLASM OF PROSTATE: ICD-10-CM

## 2025-05-08 PROCEDURE — G2211 COMPLEX E/M VISIT ADD ON: CPT | Mod: NC

## 2025-05-08 PROCEDURE — 99213 OFFICE O/P EST LOW 20 MIN: CPT

## 2025-06-17 ENCOUNTER — APPOINTMENT (OUTPATIENT)
Dept: UROLOGY | Facility: CLINIC | Age: 65
End: 2025-06-17
Payer: COMMERCIAL

## 2025-06-17 VITALS
SYSTOLIC BLOOD PRESSURE: 148 MMHG | TEMPERATURE: 98.6 F | WEIGHT: 154 LBS | DIASTOLIC BLOOD PRESSURE: 78 MMHG | HEIGHT: 67 IN | BODY MASS INDEX: 24.17 KG/M2 | HEART RATE: 86 BPM

## 2025-06-17 PROCEDURE — 93980 PENILE VASCULAR STUDY: CPT

## 2025-06-17 PROCEDURE — 54235 NJX CORPORA CAVERNOSA RX AGT: CPT

## 2025-06-17 PROCEDURE — 99214 OFFICE O/P EST MOD 30 MIN: CPT | Mod: 25

## 2025-06-17 PROCEDURE — J3490T: CUSTOM | Mod: NC

## 2025-06-17 RX ORDER — TADALAFIL 20 MG/1
20 TABLET ORAL DAILY
Qty: 30 | Refills: 11 | Status: ACTIVE | COMMUNITY
Start: 2025-06-17 | End: 1900-01-01

## 2025-06-23 ENCOUNTER — NON-APPOINTMENT (OUTPATIENT)
Age: 65
End: 2025-06-23

## 2025-06-23 ENCOUNTER — APPOINTMENT (OUTPATIENT)
Dept: UROLOGY | Facility: CLINIC | Age: 65
End: 2025-06-23
Payer: COMMERCIAL

## 2025-06-23 VITALS
TEMPERATURE: 97.8 F | DIASTOLIC BLOOD PRESSURE: 72 MMHG | HEIGHT: 67 IN | BODY MASS INDEX: 24.17 KG/M2 | HEART RATE: 81 BPM | WEIGHT: 154 LBS | SYSTOLIC BLOOD PRESSURE: 147 MMHG

## 2025-06-23 PROCEDURE — 99213 OFFICE O/P EST LOW 20 MIN: CPT

## 2025-06-23 RX ORDER — PAPAVERINE HYDROCHLORIDE 30 MG/ML
30 INJECTION, SOLUTION INTRAVENOUS
Qty: 2 | Refills: 3 | Status: ACTIVE | COMMUNITY
Start: 2025-06-23 | End: 1900-01-01

## 2025-06-23 RX ORDER — PAPAVERINE HYDROCHLORIDE 30 MG/ML
30 INJECTION, SOLUTION INTRAVENOUS
Qty: 1 | Refills: 0 | Status: COMPLETED | COMMUNITY
Start: 2025-06-17 | End: 2025-06-23

## (undated) DEVICE — ENDOCATCH 10MM

## (undated) DEVICE — XI OBTURATOR OPTICAL BLADELESS 8MM

## (undated) DEVICE — D HELP - CLEARVIEW CLEARIFY SYSTEM

## (undated) DEVICE — DRAPE TOWEL BLUE 17" X 24"

## (undated) DEVICE — DRSG DERMABOND 0.7ML

## (undated) DEVICE — NDL MAX CORE 18G X 25CM

## (undated) DEVICE — GOWN ROYAL SILK XL

## (undated) DEVICE — XI ARM NEEDLE DRIVER LARGE

## (undated) DEVICE — SYR LUER LOK 10CC

## (undated) DEVICE — SUT VLOC 90 3-0 6" CV-23 UNDYED

## (undated) DEVICE — XI SEAL UNIVERSIAL 5-12MM

## (undated) DEVICE — DRAINAGE BAG URINARY 2L

## (undated) DEVICE — DRAPE INSTRUMENT POUCH 10" X 18"

## (undated) DEVICE — SYR TOOMEY 50ML

## (undated) DEVICE — SUT VICRYL 2-0 27" SH

## (undated) DEVICE — SUT VLOC 180 3-0 6" V-20 GREEN

## (undated) DEVICE — BALLOON ENDOCAVITY 2X14CM

## (undated) DEVICE — SUT VLOC 180 2-0 9" GS-22 GREEN

## (undated) DEVICE — XI DRAPE ARM

## (undated) DEVICE — PACK GENERAL LAPAROSCOPY

## (undated) DEVICE — SUT CLIP LAPRA-TY ABSORBABLE SIZE 0.118 TO 0.12" VIOLET

## (undated) DEVICE — GLV 7.5 PROTEXIS (WHITE)

## (undated) DEVICE — DRAIN JACKSON PRATT 10MM FLAT 3/4 NO TROCAR

## (undated) DEVICE — XI SCISSOR TIP COVER

## (undated) DEVICE — TROCAR COVIDIEN VERSAPORT BLADELESS OPTICAL 5MM STANDARD

## (undated) DEVICE — TUBING ROSI REMOTE VTI

## (undated) DEVICE — DRSG TELFA 3 X 8

## (undated) DEVICE — SYR LUER LOK 50CC

## (undated) DEVICE — XI DRAPE COLUMN

## (undated) DEVICE — INSUFFLATION NDL COVIDIEN SURGINEEDLE VERESS 120MM

## (undated) DEVICE — FOLEY HOLDER STATLOCK 2 WAY ADULT

## (undated) DEVICE — XI ARM CLIP APPLIER LARGE

## (undated) DEVICE — GRID BRACHYTHERAPY EZ 18G

## (undated) DEVICE — VENODYNE/SCD SLEEVE CALF MEDIUM

## (undated) DEVICE — DRAPE MEDIUM SHEET 44" X 70"

## (undated) DEVICE — FOLEY CATH 2-WAY 20FR 5CC UNCOATED SILICONE

## (undated) DEVICE — XI ARM SCISSOR MONO CURVED

## (undated) DEVICE — TIP METZENBAUM SCISSOR MONOPOLAR ENDOCUT (ORANGE)

## (undated) DEVICE — XI ARM FORCEP MARYLAND BIPOLAR

## (undated) DEVICE — WARMING BLANKET UPPER ADULT

## (undated) DEVICE — SUT VICRYL 0 27" UR-6

## (undated) DEVICE — DRAIN RESERVOIR FOR JACKSON PRATT 100CC CARDINAL

## (undated) DEVICE — FOLEY CATH 2-WAY 18FR 5CC LATEX HYDROGEL

## (undated) DEVICE — POSITIONER PINK PAD PIGAZZI SYSTEM XL W ARM PROTECTOR

## (undated) DEVICE — PREP CHLORAPREP HI-LITE ORANGE 26ML

## (undated) DEVICE — TUBING AIRSEAL TRI-LUMEN FILTERED

## (undated) DEVICE — Device

## (undated) DEVICE — NDL HYPO REGULAR BEVEL 25G X 1.5" (BLUE)

## (undated) DEVICE — SUT MONOCRYL 4-0 27" PS-2 UNDYED

## (undated) DEVICE — TROCAR SURGIQUEST AIRSEAL 8MM X 100MM

## (undated) DEVICE — FOLEY CATH 3-WAY 24FR 30CC SOFT SIMPLASTIC

## (undated) DEVICE — NDL BIOPSY CHIBA 22G X 20CM

## (undated) DEVICE — SUT VICRYL PLUS 0 54" TIES

## (undated) DEVICE — XI ARM FORCEP PROGRASP 8MM

## (undated) DEVICE — XI ARM FORCEP FENESTRATED BIPOLAR 8MM

## (undated) DEVICE — PLASTIC SOLUTION BOWL 160Z